# Patient Record
Sex: MALE | Race: WHITE | NOT HISPANIC OR LATINO | URBAN - METROPOLITAN AREA
[De-identification: names, ages, dates, MRNs, and addresses within clinical notes are randomized per-mention and may not be internally consistent; named-entity substitution may affect disease eponyms.]

---

## 2024-01-02 ENCOUNTER — HOSPITAL ENCOUNTER (OUTPATIENT)
Dept: RADIOLOGY | Facility: EXTERNAL LOCATION | Age: 59
Discharge: HOME | End: 2024-01-02

## 2024-01-02 DIAGNOSIS — S99.922A FOOT INJURY, LEFT, INITIAL ENCOUNTER: ICD-10-CM

## 2024-08-07 ENCOUNTER — APPOINTMENT (OUTPATIENT)
Dept: PRIMARY CARE | Facility: CLINIC | Age: 59
End: 2024-08-07
Payer: COMMERCIAL

## 2024-12-23 ENCOUNTER — APPOINTMENT (OUTPATIENT)
Dept: PRIMARY CARE | Facility: CLINIC | Age: 59
End: 2024-12-23
Payer: COMMERCIAL

## 2024-12-23 ENCOUNTER — LAB (OUTPATIENT)
Dept: LAB | Facility: LAB | Age: 59
End: 2024-12-23
Payer: COMMERCIAL

## 2024-12-23 VITALS
RESPIRATION RATE: 14 BRPM | HEART RATE: 83 BPM | TEMPERATURE: 97.6 F | WEIGHT: 280 LBS | BODY MASS INDEX: 41.47 KG/M2 | OXYGEN SATURATION: 99 % | HEIGHT: 69 IN | DIASTOLIC BLOOD PRESSURE: 72 MMHG | SYSTOLIC BLOOD PRESSURE: 124 MMHG

## 2024-12-23 DIAGNOSIS — Z12.5 SCREENING FOR PROSTATE CANCER: ICD-10-CM

## 2024-12-23 DIAGNOSIS — M79.601 RIGHT ARM PAIN: ICD-10-CM

## 2024-12-23 DIAGNOSIS — M65.341 TRIGGER RING FINGER OF RIGHT HAND: ICD-10-CM

## 2024-12-23 DIAGNOSIS — Z00.00 ROUTINE HEALTH MAINTENANCE: ICD-10-CM

## 2024-12-23 DIAGNOSIS — Z00.00 ROUTINE HEALTH MAINTENANCE: Primary | ICD-10-CM

## 2024-12-23 PROBLEM — R20.2 TINGLING IN EXTREMITIES: Status: ACTIVE | Noted: 2024-12-23

## 2024-12-23 PROBLEM — M54.31 SCIATICA OF RIGHT SIDE: Status: ACTIVE | Noted: 2024-12-23

## 2024-12-23 PROBLEM — M65.80: Status: ACTIVE | Noted: 2020-08-18

## 2024-12-23 PROBLEM — M79.672 PAIN IN LEFT FOOT: Status: ACTIVE | Noted: 2024-05-28

## 2024-12-23 PROBLEM — M72.2 PLANTAR FASCIITIS, LEFT: Status: ACTIVE | Noted: 2024-05-28

## 2024-12-23 PROBLEM — M77.31 CALCANEAL SPUR, RIGHT: Status: ACTIVE | Noted: 2020-08-18

## 2024-12-23 PROBLEM — M19.071 DEGENERATIVE JOINT DISEASE OF BOTH ANKLES AND FEET: Status: ACTIVE | Noted: 2024-05-28

## 2024-12-23 PROBLEM — M76.71 PERONEAL TENDONITIS OF RIGHT LOWER EXTREMITY: Status: ACTIVE | Noted: 2024-05-28

## 2024-12-23 PROBLEM — M93.271: Status: ACTIVE | Noted: 2024-05-28

## 2024-12-23 PROBLEM — M19.072 DEGENERATIVE JOINT DISEASE OF BOTH ANKLES AND FEET: Status: ACTIVE | Noted: 2024-05-28

## 2024-12-23 PROBLEM — M54.16 LUMBAR RADICULOPATHY: Status: ACTIVE | Noted: 2024-12-23

## 2024-12-23 PROBLEM — M72.2 PLANTAR FASCIITIS, RIGHT: Status: ACTIVE | Noted: 2020-08-18

## 2024-12-23 PROBLEM — M54.50 LOW BACK PAIN: Status: ACTIVE | Noted: 2024-12-23

## 2024-12-23 PROBLEM — H61.20 CERUMEN IMPACTION: Status: ACTIVE | Noted: 2024-12-23

## 2024-12-23 PROBLEM — M79.671 PAIN OF RIGHT HEEL: Status: ACTIVE | Noted: 2020-08-18

## 2024-12-23 LAB
ALBUMIN SERPL BCP-MCNC: 4.2 G/DL (ref 3.4–5)
ALP SERPL-CCNC: 80 U/L (ref 33–120)
ALT SERPL W P-5'-P-CCNC: 15 U/L (ref 10–52)
ANION GAP SERPL CALC-SCNC: 11 MMOL/L (ref 10–20)
AST SERPL W P-5'-P-CCNC: 14 U/L (ref 9–39)
BILIRUB SERPL-MCNC: 0.6 MG/DL (ref 0–1.2)
BUN SERPL-MCNC: 16 MG/DL (ref 6–23)
CALCIUM SERPL-MCNC: 9.5 MG/DL (ref 8.6–10.6)
CHLORIDE SERPL-SCNC: 106 MMOL/L (ref 98–107)
CHOLEST SERPL-MCNC: 174 MG/DL (ref 0–199)
CHOLESTEROL/HDL RATIO: 3.7
CO2 SERPL-SCNC: 30 MMOL/L (ref 21–32)
CREAT SERPL-MCNC: 1.01 MG/DL (ref 0.5–1.3)
EGFRCR SERPLBLD CKD-EPI 2021: 86 ML/MIN/1.73M*2
ERYTHROCYTE [DISTWIDTH] IN BLOOD BY AUTOMATED COUNT: 13.2 % (ref 11.5–14.5)
GLUCOSE SERPL-MCNC: 94 MG/DL (ref 74–99)
HCT VFR BLD AUTO: 46.8 % (ref 41–52)
HCV AB SER QL: NONREACTIVE
HDLC SERPL-MCNC: 47.4 MG/DL
HGB BLD-MCNC: 14.7 G/DL (ref 13.5–17.5)
HIV 1+2 AB+HIV1 P24 AG SERPL QL IA: NONREACTIVE
LDLC SERPL CALC-MCNC: 113 MG/DL
MCH RBC QN AUTO: 29.9 PG (ref 26–34)
MCHC RBC AUTO-ENTMCNC: 31.4 G/DL (ref 32–36)
MCV RBC AUTO: 95 FL (ref 80–100)
NON HDL CHOLESTEROL: 127 MG/DL (ref 0–149)
NRBC BLD-RTO: 0 /100 WBCS (ref 0–0)
PLATELET # BLD AUTO: 247 X10*3/UL (ref 150–450)
POTASSIUM SERPL-SCNC: 4.8 MMOL/L (ref 3.5–5.3)
PROT SERPL-MCNC: 6.7 G/DL (ref 6.4–8.2)
PSA SERPL-MCNC: 3.72 NG/ML
RBC # BLD AUTO: 4.91 X10*6/UL (ref 4.5–5.9)
SODIUM SERPL-SCNC: 142 MMOL/L (ref 136–145)
TRIGL SERPL-MCNC: 69 MG/DL (ref 0–149)
TSH SERPL-ACNC: 2.77 MIU/L (ref 0.44–3.98)
VLDL: 14 MG/DL (ref 0–40)
WBC # BLD AUTO: 3.8 X10*3/UL (ref 4.4–11.3)

## 2024-12-23 PROCEDURE — 84153 ASSAY OF PSA TOTAL: CPT

## 2024-12-23 PROCEDURE — 86803 HEPATITIS C AB TEST: CPT

## 2024-12-23 PROCEDURE — 1036F TOBACCO NON-USER: CPT | Performed by: STUDENT IN AN ORGANIZED HEALTH CARE EDUCATION/TRAINING PROGRAM

## 2024-12-23 PROCEDURE — 80061 LIPID PANEL: CPT

## 2024-12-23 PROCEDURE — 3008F BODY MASS INDEX DOCD: CPT | Performed by: STUDENT IN AN ORGANIZED HEALTH CARE EDUCATION/TRAINING PROGRAM

## 2024-12-23 PROCEDURE — 99204 OFFICE O/P NEW MOD 45 MIN: CPT | Performed by: STUDENT IN AN ORGANIZED HEALTH CARE EDUCATION/TRAINING PROGRAM

## 2024-12-23 PROCEDURE — 80053 COMPREHEN METABOLIC PANEL: CPT

## 2024-12-23 PROCEDURE — 85027 COMPLETE CBC AUTOMATED: CPT

## 2024-12-23 PROCEDURE — 84443 ASSAY THYROID STIM HORMONE: CPT

## 2024-12-23 PROCEDURE — 36415 COLL VENOUS BLD VENIPUNCTURE: CPT

## 2024-12-23 PROCEDURE — 87389 HIV-1 AG W/HIV-1&-2 AB AG IA: CPT

## 2024-12-23 ASSESSMENT — PATIENT HEALTH QUESTIONNAIRE - PHQ9
1. LITTLE INTEREST OR PLEASURE IN DOING THINGS: NOT AT ALL
2. FEELING DOWN, DEPRESSED OR HOPELESS: NOT AT ALL
SUM OF ALL RESPONSES TO PHQ9 QUESTIONS 1 AND 2: 0

## 2024-12-23 ASSESSMENT — ENCOUNTER SYMPTOMS
LIGHT-HEADEDNESS: 0
DYSURIA: 0
VOMITING: 0
CHILLS: 0
SHORTNESS OF BREATH: 0
NAUSEA: 0
FEVER: 0
DIARRHEA: 0
DIAPHORESIS: 0
DIZZINESS: 0

## 2024-12-23 NOTE — PROGRESS NOTES
"Subjective   Patient ID: Alf Horton is a 59 y.o. male who presents for Establish Care, Trigger Finger (Right ring finger. ), and Arm Pain (Upper right arm issues with ROM and pressure on that arm. ).  HPI    Right trigger finger started 3 weeks ago, hard to close at times. Pain flares to 8 out of 10, stress ball helps. Pain on flexor tendor. Right ring finger sometimes locks up and can pop. Never had this before. No numbness/tingling, weakness.      A few weeks ago he has had trouble lifting up his arm due to pain. Having arm in front of him on chest helps. Topical otc cream helps somewhat.     Works as shipping  coordinator for 21 years.     Review of Systems   Constitutional:  Negative for chills, diaphoresis and fever.   HENT:  Positive for hearing loss (chronic, has hearing aids.).    Eyes:  Negative for visual disturbance.   Respiratory:  Negative for shortness of breath.    Cardiovascular:  Negative for chest pain.   Gastrointestinal:  Negative for diarrhea, nausea and vomiting.   Genitourinary:  Negative for dysuria.   Neurological:  Negative for dizziness, syncope and light-headedness.       /72   Pulse 83   Temp 36.4 °C (97.6 °F)   Resp 14   Ht 1.753 m (5' 9\")   Wt 127 kg (280 lb)   SpO2 99%   BMI 41.35 kg/m²     Objective   Physical Exam  Vitals reviewed.   Constitutional:       General: He is not in acute distress.     Appearance: Normal appearance.   HENT:      Head: Normocephalic.   Cardiovascular:      Rate and Rhythm: Normal rate and regular rhythm.      Pulses: Normal pulses.   Pulmonary:      Effort: Pulmonary effort is normal. No respiratory distress.      Breath sounds: Normal breath sounds.   Abdominal:      General: There is no distension.   Musculoskeletal:         General: No deformity.      Comments: Positive empty can test on the right, strength intact bilateral upper extremities.  Sensation intact bilateral upper extremities.   Skin:     Capillary Refill: Capillary " refill takes less than 2 seconds.      Coloration: Skin is not jaundiced.   Neurological:      Mental Status: He is alert.         Assessment/Plan   Problem List Items Addressed This Visit    None  Visit Diagnoses       Routine health maintenance    -  Primary    Relevant Orders    Lipid Panel    Comprehensive Metabolic Panel    CBC    TSH with reflex to Free T4 if abnormal    HIV 1/2 Antigen/Antibody Screen with Reflex to Confirmation    Hepatitis C Antibody    Screening for prostate cancer        Relevant Orders    Prostate Spec.Ag,Screen    Trigger ring finger of right hand        Relevant Orders    Referral to Orthopaedic Surgery    Right arm pain        Relevant Orders    Referral to Sports Medicine            Right ring trigger finger  -Refer to ortho to assist with management  - Can try oral NSAIDs over-the-counter for a few days only as needed for pain    Right arm pain  -Unclear etiology, refer to sports med    Right foot pain  -Cortisone helps, sees podiatry    Health Maintenance  -Prostate Cancer Screening: Ordered  -Vaccinations: Will discuss  -Lung Cancer Screening: Not indicated  -AAA Screening: Not indicated  -Colonoscopy: Will discuss  -Screen for HIV/Hep C: Ordered  -Advance Care Planning: Will discuss    , 3 children. Son just got . Works as shipping  coordinator for 21 years.    Fasting labs ordered  CPE in 4 to 6 weeks, follow-up sooner if needed.

## 2025-01-15 ENCOUNTER — OFFICE VISIT (OUTPATIENT)
Dept: ORTHOPEDIC SURGERY | Facility: CLINIC | Age: 60
End: 2025-01-15
Payer: COMMERCIAL

## 2025-01-15 ENCOUNTER — HOSPITAL ENCOUNTER (OUTPATIENT)
Dept: RADIOLOGY | Facility: CLINIC | Age: 60
Discharge: HOME | End: 2025-01-15
Payer: COMMERCIAL

## 2025-01-15 DIAGNOSIS — M25.511 RIGHT SHOULDER PAIN, UNSPECIFIED CHRONICITY: ICD-10-CM

## 2025-01-15 DIAGNOSIS — M79.601 RIGHT ARM PAIN: ICD-10-CM

## 2025-01-15 DIAGNOSIS — M75.41 ROTATOR CUFF IMPINGEMENT SYNDROME OF RIGHT SHOULDER: Primary | ICD-10-CM

## 2025-01-15 DIAGNOSIS — M65.341 TRIGGER RING FINGER OF RIGHT HAND: ICD-10-CM

## 2025-01-15 PROCEDURE — 99203 OFFICE O/P NEW LOW 30 MIN: CPT | Performed by: ORTHOPAEDIC SURGERY

## 2025-01-15 PROCEDURE — 99203 OFFICE O/P NEW LOW 30 MIN: CPT | Performed by: FAMILY MEDICINE

## 2025-01-15 PROCEDURE — 2500000004 HC RX 250 GENERAL PHARMACY W/ HCPCS (ALT 636 FOR OP/ED): Performed by: FAMILY MEDICINE

## 2025-01-15 PROCEDURE — 20550 NJX 1 TENDON SHEATH/LIGAMENT: CPT | Mod: RT | Performed by: ORTHOPAEDIC SURGERY

## 2025-01-15 PROCEDURE — 99213 OFFICE O/P EST LOW 20 MIN: CPT | Mod: 25 | Performed by: FAMILY MEDICINE

## 2025-01-15 PROCEDURE — 20610 DRAIN/INJ JOINT/BURSA W/O US: CPT | Mod: RT | Performed by: FAMILY MEDICINE

## 2025-01-15 PROCEDURE — 99213 OFFICE O/P EST LOW 20 MIN: CPT | Mod: 25 | Performed by: ORTHOPAEDIC SURGERY

## 2025-01-15 PROCEDURE — 1036F TOBACCO NON-USER: CPT | Performed by: FAMILY MEDICINE

## 2025-01-15 PROCEDURE — 2500000004 HC RX 250 GENERAL PHARMACY W/ HCPCS (ALT 636 FOR OP/ED): Performed by: ORTHOPAEDIC SURGERY

## 2025-01-15 PROCEDURE — 1036F TOBACCO NON-USER: CPT | Performed by: ORTHOPAEDIC SURGERY

## 2025-01-15 PROCEDURE — 73030 X-RAY EXAM OF SHOULDER: CPT | Mod: RT

## 2025-01-15 PROCEDURE — 73030 X-RAY EXAM OF SHOULDER: CPT | Mod: RIGHT SIDE | Performed by: RADIOLOGY

## 2025-01-15 RX ORDER — LIDOCAINE HYDROCHLORIDE 20 MG/ML
2 INJECTION, SOLUTION INFILTRATION; PERINEURAL
Status: COMPLETED | OUTPATIENT
Start: 2025-01-15 | End: 2025-01-15

## 2025-01-15 RX ORDER — TRIAMCINOLONE ACETONIDE 40 MG/ML
40 INJECTION, SUSPENSION INTRA-ARTICULAR; INTRAMUSCULAR
Status: COMPLETED | OUTPATIENT
Start: 2025-01-15 | End: 2025-01-15

## 2025-01-15 RX ADMIN — TRIAMCINOLONE ACETONIDE 40 MG: 400 INJECTION, SUSPENSION INTRA-ARTICULAR; INTRAMUSCULAR at 16:17

## 2025-01-15 RX ADMIN — LIDOCAINE HYDROCHLORIDE 0.5 ML: 10 INJECTION, SOLUTION INFILTRATION; PERINEURAL at 08:40

## 2025-01-15 RX ADMIN — TRIAMCINOLONE ACETONIDE 20 MG: 40 INJECTION, SUSPENSION INTRA-ARTICULAR; INTRAMUSCULAR at 08:40

## 2025-01-15 RX ADMIN — LIDOCAINE HYDROCHLORIDE 2 ML: 20 INJECTION, SOLUTION INFILTRATION; PERINEURAL at 16:17

## 2025-01-15 NOTE — LETTER
January 15, 2025     Andrez Akers DO  [address]  [facility], [address]  [facility] [address]    Patient: Afl Horton   YOB: 1965   Date of Visit: 1/15/2025       Dear Dr. Andrez Akers, :    Thank you for referring Alf Horton to me for evaluation. Below are my notes for this consultation.  If you have questions, please do not hesitate to call me. I look forward to following your patient along with you.       Sincerely,     Chirag Villatoro MD      CC: No Recipients  ______________________________________________________________________________________      History of Present Illness   Chief Complaint   Patient presents with   • Right Shoulder - Pain     NPV R SHOULDER PAIN X COUPLE MONTHS NKI       The patient is 59 y.o. right-hand-dominant male  here with a complaint of right shoulder/upper arm pain.  Referred by PCP Andrez Akers.  onset of symptoms over the past 3 months, atraumatic in nature, does do some repetitive arm/shoulder activity at work which he thinks may have aggravated symptoms though he cannot pinpoint any specific injury.  He says that he has been having notable discomfort laying on his shoulder at night, he has pain with shoulder abduction.  He feels like there is some limited range of motion with this.  He has been taking ibuprofen and using some topical medications with minimal change.  He denies any popping or clicking, no shoulder instability.  He denies any significant shoulder problems in the past.    History reviewed. No pertinent past medical history.    Medication Documentation Review Audit       Reviewed by Andrew Hallman MA (Medical Assistant) on 01/15/25 at 1558      Medication Order Taking? Sig Documenting Provider Last Dose Status            No Medications to Display                                   Allergies   Allergen Reactions   • Penicillins Hives and Rash     Hives       Social History     Socioeconomic History   • Marital status:       Spouse name: Not on file   • Number of children: Not on file   • Years of education: Not on file   • Highest education level: Not on file   Occupational History   • Not on file   Tobacco Use   • Smoking status: Never   • Smokeless tobacco: Never   Substance and Sexual Activity   • Alcohol use: Never   • Drug use: Never   • Sexual activity: Not on file   Other Topics Concern   • Not on file   Social History Narrative   • Not on file     Social Drivers of Health     Financial Resource Strain: Not on file   Food Insecurity: Not on file   Transportation Needs: Not on file   Physical Activity: Not on file   Stress: Not on file   Social Connections: Not on file   Intimate Partner Violence: Not on file   Housing Stability: Not on file       Past Surgical History:   Procedure Laterality Date   • OTHER SURGICAL HISTORY  03/04/2020    Appendectomy          Review of Systems   GENERAL: Negative  GI: Negative  MUSCULOSKELETAL: See HPI  SKIN: Negative  NEURO:  Negative     Physical Exam:    General/Constitutional: well appearing, no distress, appears stated age  HEENT: sclera clear  Respiratory: non labored breathing  Vascular: No edema, swelling or tenderness, except as noted in detailed exam.  Integumentary: No impressive skin lesions present, except as noted in detailed exam.  Neurological:  Alert and oriented   Psychological:  Normal mood and affect.  Musculoskeletal: Normal, except as noted in detailed exam and in HPI    Right shoulder: Normal appearance, glenohumeral joint is reduced, no skin changes.  There is some mild tenderness at the subacromial space, there is no tenderness at the biceps muscle belly, normal contour of the biceps muscle.  He has good range of motion with forward flexion without pain, slight limited with abduction to around 120 degrees actively with positive painful arc, no passive range of motion with abduction but there is pain.  There is no weakness with rotator cuff strength testing,  there are some mild discomfort with resisted abduction.  There is pain with Edwards and Neer's test.  Mild pain with Jefferson test.  Negative Speed, negative Yergason.  No shoulder instability.       Imaging: X-rays of right shoulder obtained today and independently reviewed, there is some degenerative changes of the AC joint, normal-appearing glenohumeral joint, no acute abnormalities are present.    L Inj/Asp: R subacromial bursa on 1/15/2025 4:17 PM  Indications: pain  Details: 22 G needle, posterior approach  Medications: 40 mg triamcinolone acetonide 40 mg/mL; 2 mL lidocaine 20 mg/mL (2 %)  Outcome: tolerated well, no immediate complications  Procedure, treatment alternatives, risks and benefits explained, specific risks discussed. Consent was given by the patient. Immediately prior to procedure a time out was called to verify the correct patient, procedure, equipment, support staff and site/side marked as required. Patient was prepped and draped in the usual sterile fashion.               Assessment   1. Rotator cuff impingement syndrome of right shoulder        2. Right shoulder pain, unspecified chronicity  XR shoulder right 2+ views      3. Right arm pain  Referral to Sports Medicine            Plan: Right shoulder pain consistent with rotator cuff impingement syndrome.  Discussed diagnosis, reviewed x-rays, treatment with patient.,  Explained that upper arm pain is typically referred pain from the shoulder/rotator cuff.  We did proceed with subacromial injection with Kenalog today, he is also given a home exercise rehabilitation/shoulder strengthening program that he will begin.  He will monitor for improvement with conservative treatment.  He will plan to follow-up as symptoms dictate.

## 2025-01-15 NOTE — PROGRESS NOTES
History of Present Illness   Chief Complaint   Patient presents with    Right Shoulder - Pain     NPV R SHOULDER PAIN X COUPLE MONTHS NKI       The patient is 59 y.o. right-hand-dominant male  here with a complaint of right shoulder/upper arm pain.  Referred by PCP Andrez Akers.  onset of symptoms over the past 3 months, atraumatic in nature, does do some repetitive arm/shoulder activity at work which he thinks may have aggravated symptoms though he cannot pinpoint any specific injury.  He says that he has been having notable discomfort laying on his shoulder at night, he has pain with shoulder abduction.  He feels like there is some limited range of motion with this.  He has been taking ibuprofen and using some topical medications with minimal change.  He denies any popping or clicking, no shoulder instability.  He denies any significant shoulder problems in the past.    History reviewed. No pertinent past medical history.    Medication Documentation Review Audit       Reviewed by Andrew Hallman MA (Medical Assistant) on 01/15/25 at 1558      Medication Order Taking? Sig Documenting Provider Last Dose Status            No Medications to Display                                   Allergies   Allergen Reactions    Penicillins Hives and Rash     Hives       Social History     Socioeconomic History    Marital status:      Spouse name: Not on file    Number of children: Not on file    Years of education: Not on file    Highest education level: Not on file   Occupational History    Not on file   Tobacco Use    Smoking status: Never    Smokeless tobacco: Never   Substance and Sexual Activity    Alcohol use: Never    Drug use: Never    Sexual activity: Not on file   Other Topics Concern    Not on file   Social History Narrative    Not on file     Social Drivers of Health     Financial Resource Strain: Not on file   Food Insecurity: Not on file   Transportation Needs: Not on file   Physical Activity: Not  on file   Stress: Not on file   Social Connections: Not on file   Intimate Partner Violence: Not on file   Housing Stability: Not on file       Past Surgical History:   Procedure Laterality Date    OTHER SURGICAL HISTORY  03/04/2020    Appendectomy          Review of Systems   GENERAL: Negative  GI: Negative  MUSCULOSKELETAL: See HPI  SKIN: Negative  NEURO:  Negative     Physical Exam:    General/Constitutional: well appearing, no distress, appears stated age  HEENT: sclera clear  Respiratory: non labored breathing  Vascular: No edema, swelling or tenderness, except as noted in detailed exam.  Integumentary: No impressive skin lesions present, except as noted in detailed exam.  Neurological:  Alert and oriented   Psychological:  Normal mood and affect.  Musculoskeletal: Normal, except as noted in detailed exam and in HPI    Right shoulder: Normal appearance, glenohumeral joint is reduced, no skin changes.  There is some mild tenderness at the subacromial space, there is no tenderness at the biceps muscle belly, normal contour of the biceps muscle.  He has good range of motion with forward flexion without pain, slight limited with abduction to around 120 degrees actively with positive painful arc, no passive range of motion with abduction but there is pain.  There is no weakness with rotator cuff strength testing, there are some mild discomfort with resisted abduction.  There is pain with Edwards and Neer's test.  Mild pain with Lowell test.  Negative Speed, negative Yergason.  No shoulder instability.       Imaging: X-rays of right shoulder obtained today and independently reviewed, there is some degenerative changes of the AC joint, normal-appearing glenohumeral joint, no acute abnormalities are present.    L Inj/Asp: R subacromial bursa on 1/15/2025 4:17 PM  Indications: pain  Details: 22 G needle, posterior approach  Medications: 40 mg triamcinolone acetonide 40 mg/mL; 2 mL lidocaine 20 mg/mL (2 %)  Outcome:  tolerated well, no immediate complications  Procedure, treatment alternatives, risks and benefits explained, specific risks discussed. Consent was given by the patient. Immediately prior to procedure a time out was called to verify the correct patient, procedure, equipment, support staff and site/side marked as required. Patient was prepped and draped in the usual sterile fashion.               Assessment   1. Rotator cuff impingement syndrome of right shoulder        2. Right shoulder pain, unspecified chronicity  XR shoulder right 2+ views      3. Right arm pain  Referral to Sports Medicine            Plan: Right shoulder pain consistent with rotator cuff impingement syndrome.  Discussed diagnosis, reviewed x-rays, treatment with patient.,  Explained that upper arm pain is typically referred pain from the shoulder/rotator cuff.  We did proceed with subacromial injection with Kenalog today, he is also given a home exercise rehabilitation/shoulder strengthening program that he will begin.  He will monitor for improvement with conservative treatment.  He will plan to follow-up as symptoms dictate.

## 2025-01-15 NOTE — Clinical Note
January 16, 2025     Andrez Akers,   [address]  [facility], [address]  [facility] [address]    Patient: Alf Horton   YOB: 1965   Date of Visit: 1/15/2025       Dear Dr. Andrez Akers, DO:    Thank you for referring Alf Horton to me for evaluation. Below are my notes for this consultation.  If you have questions, please do not hesitate to call me. I look forward to following your patient along with you.       Sincerely,     Isaias Jean Baptiste MD      CC: No Recipients  ______________________________________________________________________________________

## 2025-01-15 NOTE — LETTER
tingling.  No similar problems in other digits.  No particular intervention so far.    He is not diabetic or hypothyroid.  He does not smoke.      REVIEW OF SYSTEMS       A 30-item multi-system Review Of Systems was obtained on today's intake form.  This was reviewed with the patient and is correct.  The pertinent positives and negatives are listed above.  The form has been scanned separately into the medical record.      PHYSICAL EXAM    Constitutional:    Appears stated age. Well-developed and well-nourished morbidly obese male in no acute distress.  Psychiatric:         Pleasant normal mood and affect. Behavior is appropriate for the situation.   Head:                   Normocephalic and atraumatic.  Eyes:                    Pupils are equal and round.  Cardiovascular:  2+ radial and ulnar pulses. Fingers well-perfused.  Respiratory:        Effort normal. No respiratory distress. Speaking in complete sentences.  Neurologic:       Alert and oriented to person, place, and time.  Skin:                Skin is intact, warm and dry.  Hematologic / Lymphatic:    No lymphedema or lymphangitis.    Extremities / Musculoskeletal:                      Skin of right ring finger and hand is intact with no erythema, ecchymosis, or diffuse swelling.  Normal skin drag and coloration.  Full composite finger flexion extension with good intrinsic plus minus posture.  Symmetric wrist and forearm motion.  Palpable flexor nodule and A1 tenderness only of right ring finger.  Obvious spontaneous triggering.  Negative Rosa.  Negative midcarpal shift.  Sensation intact to light touch in all distributions.  Capillary refill less than 2 seconds.      IMAGING / LABS / EMGs           None pertinent      No past medical history on file.    Medication Documentation Review Audit       Reviewed by Andrez Akers DO (Physician) on 12/23/24 at 0859      Medication Order Taking? Sig Documenting Provider Last Dose Status            No Medications to  Display                                   Allergies   Allergen Reactions   • Penicillins Hives and Rash     Hives       Social History     Socioeconomic History   • Marital status:      Spouse name: Not on file   • Number of children: Not on file   • Years of education: Not on file   • Highest education level: Not on file   Occupational History   • Not on file   Tobacco Use   • Smoking status: Never   • Smokeless tobacco: Never   Substance and Sexual Activity   • Alcohol use: Never   • Drug use: Never   • Sexual activity: Not on file   Other Topics Concern   • Not on file   Social History Narrative   • Not on file     Social Drivers of Health     Financial Resource Strain: Not on file   Food Insecurity: Not on file   Transportation Needs: Not on file   Physical Activity: Not on file   Stress: Not on file   Social Connections: Not on file   Intimate Partner Violence: Not on file   Housing Stability: Not on file       Past Surgical History:   Procedure Laterality Date   • OTHER SURGICAL HISTORY  03/04/2020    Appendectomy       PROCEDURE    Hand / UE Inj/Asp: R ring A1 for trigger finger on 1/15/2025 8:40 AM  Indications: therapeutic  Details: 25 G needle, volar approach  Medications: 20 mg triamcinolone acetonide 40 mg/mL; 0.5 mL lidocaine 10 mg/mL (1 %)  Outcome: tolerated well, no immediate complications  Procedure, treatment alternatives, risks and benefits explained, specific risks discussed. Consent was given by the patient.           Electronically Signed      ALOK Jean Baptiste MD      Orthopaedic Hand Surgery      [phone]

## 2025-01-15 NOTE — PROGRESS NOTES
CHIEF COMPLAINT         Right ring trigger finger    ASSESSMENT + PLAN     ***        HISTORY OF PRESENT ILLNESS       Patient is a 59 y.o. ***-hand dominant male ***, who presents today for evaluation of ***      REVIEW OF SYSTEMS       A 30-item multi-system Review Of Systems was obtained on today's intake form.  This was reviewed with the patient and is correct.  The pertinent positives and negatives are listed above.  The form has been scanned separately into the medical record.      PHYSICAL EXAM    Constitutional:    Appears stated age. Well-developed and well-nourished ***.  Psychiatric:         Pleasant normal mood and affect. Behavior is appropriate for the situation.   Head:                   Normocephalic and atraumatic.  Eyes:                    Pupils are equal and round.  Cardiovascular:  2+ radial and ulnar pulses. Fingers well-perfused.  Respiratory:        Effort normal. No respiratory distress. Speaking in complete sentences.  Neurologic:       Alert and oriented to person, place, and time.  Skin:                Skin is intact, warm and dry.  Hematologic / Lymphatic:    No lymphedema or lymphangitis.    Extremities / Musculoskeletal:                      ***      IMAGING / LABS / EMGs           ***      No past medical history on file.    Medication Documentation Review Audit       Reviewed by Andrez Akers DO (Physician) on 12/23/24 at 0859      Medication Order Taking? Sig Documenting Provider Last Dose Status            No Medications to Display                                   Allergies   Allergen Reactions    Penicillins Hives and Rash     Hives       Social History     Socioeconomic History    Marital status:      Spouse name: Not on file    Number of children: Not on file    Years of education: Not on file    Highest education level: Not on file   Occupational History    Not on file   Tobacco Use    Smoking status: Never    Smokeless tobacco: Never   Substance and Sexual Activity     Alcohol use: Never    Drug use: Never    Sexual activity: Not on file   Other Topics Concern    Not on file   Social History Narrative    Not on file     Social Drivers of Health     Financial Resource Strain: Not on file   Food Insecurity: Not on file   Transportation Needs: Not on file   Physical Activity: Not on file   Stress: Not on file   Social Connections: Not on file   Intimate Partner Violence: Not on file   Housing Stability: Not on file       Past Surgical History:   Procedure Laterality Date    OTHER SURGICAL HISTORY  03/04/2020    Appendectomy         Electronically Signed      ALOK Jean Baptiste MD      Orthopaedic Hand Surgery      [phone]   Not on file     Social Drivers of Health     Financial Resource Strain: Not on file   Food Insecurity: Not on file   Transportation Needs: Not on file   Physical Activity: Not on file   Stress: Not on file   Social Connections: Not on file   Intimate Partner Violence: Not on file   Housing Stability: Not on file       Past Surgical History:   Procedure Laterality Date    OTHER SURGICAL HISTORY  03/04/2020    Appendectomy       PROCEDURE    Hand / UE Inj/Asp: R ring A1 for trigger finger on 1/15/2025 8:40 AM  Indications: therapeutic  Details: 25 G needle, volar approach  Medications: 20 mg triamcinolone acetonide 40 mg/mL; 0.5 mL lidocaine 10 mg/mL (1 %)  Outcome: tolerated well, no immediate complications  Procedure, treatment alternatives, risks and benefits explained, specific risks discussed. Consent was given by the patient.           Electronically Signed      ALOK Jean Baptiste MD      Orthopaedic Hand Surgery      [phone]

## 2025-01-22 PROBLEM — M65.341 TRIGGER RING FINGER OF RIGHT HAND: Status: ACTIVE | Noted: 2025-01-22

## 2025-01-22 RX ORDER — LIDOCAINE HYDROCHLORIDE 10 MG/ML
0.5 INJECTION, SOLUTION INFILTRATION; PERINEURAL
Status: COMPLETED | OUTPATIENT
Start: 2025-01-15 | End: 2025-01-15

## 2025-01-22 RX ORDER — TRIAMCINOLONE ACETONIDE 40 MG/ML
20 INJECTION, SUSPENSION INTRA-ARTICULAR; INTRAMUSCULAR
Status: COMPLETED | OUTPATIENT
Start: 2025-01-15 | End: 2025-01-15

## 2025-02-04 ENCOUNTER — APPOINTMENT (OUTPATIENT)
Dept: PRIMARY CARE | Facility: CLINIC | Age: 60
End: 2025-02-04
Payer: COMMERCIAL

## 2025-02-04 VITALS
HEART RATE: 72 BPM | TEMPERATURE: 98.3 F | WEIGHT: 279 LBS | SYSTOLIC BLOOD PRESSURE: 112 MMHG | OXYGEN SATURATION: 99 % | DIASTOLIC BLOOD PRESSURE: 68 MMHG | BODY MASS INDEX: 41.32 KG/M2 | HEIGHT: 69 IN | RESPIRATION RATE: 16 BRPM

## 2025-02-04 DIAGNOSIS — M25.511 RIGHT SHOULDER PAIN, UNSPECIFIED CHRONICITY: ICD-10-CM

## 2025-02-04 DIAGNOSIS — D72.819 LEUKOPENIA, UNSPECIFIED TYPE: ICD-10-CM

## 2025-02-04 DIAGNOSIS — M79.671 PAIN OF RIGHT HEEL: ICD-10-CM

## 2025-02-04 DIAGNOSIS — R06.83 SNORING: ICD-10-CM

## 2025-02-04 DIAGNOSIS — E78.5 HYPERLIPIDEMIA, UNSPECIFIED HYPERLIPIDEMIA TYPE: Primary | ICD-10-CM

## 2025-02-04 DIAGNOSIS — Z00.00 ROUTINE HEALTH MAINTENANCE: ICD-10-CM

## 2025-02-04 DIAGNOSIS — M65.341 TRIGGER RING FINGER OF RIGHT HAND: ICD-10-CM

## 2025-02-04 PROCEDURE — 93000 ELECTROCARDIOGRAM COMPLETE: CPT | Performed by: STUDENT IN AN ORGANIZED HEALTH CARE EDUCATION/TRAINING PROGRAM

## 2025-02-04 ASSESSMENT — ENCOUNTER SYMPTOMS
DIARRHEA: 0
NAUSEA: 0
FEVER: 0
DIAPHORESIS: 0
MYALGIAS: 0
DYSURIA: 0
VOMITING: 0
SHORTNESS OF BREATH: 0
CHILLS: 0
LIGHT-HEADEDNESS: 0
DIZZINESS: 0

## 2025-02-04 ASSESSMENT — PATIENT HEALTH QUESTIONNAIRE - PHQ9
2. FEELING DOWN, DEPRESSED OR HOPELESS: NOT AT ALL
SUM OF ALL RESPONSES TO PHQ9 QUESTIONS 1 AND 2: 0
1. LITTLE INTEREST OR PLEASURE IN DOING THINGS: NOT AT ALL

## 2025-02-04 NOTE — PROGRESS NOTES
"Subjective   Patient ID: Alf Horton is a 59 y.o. male who presents for Annual Exam.  HPI  Here for CPE. No acute concerns today. We reviewed labs and conditions in detail.      Review of Systems   Constitutional:  Negative for chills, diaphoresis and fever.   HENT:  Negative for hearing loss (has hearing aids).    Eyes:  Negative for visual disturbance.   Respiratory:  Negative for shortness of breath.    Cardiovascular:  Negative for chest pain.   Gastrointestinal:  Negative for diarrhea, nausea and vomiting.   Endocrine: Negative for cold intolerance and heat intolerance.   Genitourinary:  Negative for dysuria, scrotal swelling and testicular pain.   Musculoskeletal:  Negative for myalgias.   Skin:  Negative for rash.   Neurological:  Negative for dizziness, syncope and light-headedness.       /68   Pulse 72   Temp 36.8 °C (98.3 °F)   Resp 16   Ht 1.753 m (5' 9\")   Wt 127 kg (279 lb)   SpO2 99%   BMI 41.20 kg/m²     Objective   Physical Exam  Vitals reviewed.   Constitutional:       General: He is not in acute distress.     Appearance: Normal appearance.   HENT:      Head: Normocephalic.      Right Ear: Tympanic membrane, ear canal and external ear normal. There is no impacted cerumen.      Left Ear: Tympanic membrane, ear canal and external ear normal. There is no impacted cerumen.      Mouth/Throat:      Mouth: Mucous membranes are moist.      Pharynx: Oropharynx is clear. No oropharyngeal exudate or posterior oropharyngeal erythema.   Cardiovascular:      Rate and Rhythm: Normal rate and regular rhythm.   Pulmonary:      Effort: Pulmonary effort is normal. No respiratory distress.      Breath sounds: Normal breath sounds.   Abdominal:      General: Bowel sounds are normal. There is no distension.      Palpations: Abdomen is soft. There is no mass.      Tenderness: There is no abdominal tenderness. There is no guarding or rebound.   Musculoskeletal:         General: No deformity.      Cervical " back: Neck supple. No tenderness.   Lymphadenopathy:      Cervical: No cervical adenopathy.   Skin:     Coloration: Skin is not jaundiced.   Neurological:      Mental Status: He is alert.         Assessment/Plan   Problem List Items Addressed This Visit       Pain of right heel    Trigger ring finger of right hand    Leukopenia    Relevant Orders    CBC and Auto Differential    BMI 40.0-44.9, adult (Multi)    Relevant Orders    Home sleep apnea test (HSAT)    Snoring    Relevant Orders    Home sleep apnea test (HSAT)    Hyperlipidemia - Primary    Relevant Orders    ECG 12 lead (Clinic Performed) (Completed)    Routine health maintenance    Relevant Orders    Lipid Panel    Right shoulder pain         Right ring trigger finger  -Saw orthopedics, will see again in 4 weeks.  He underwent injection.     Right rotator cuff  - Saw orthopedics had injection and given regimen for home exercise and shoulder strengthening.  He will see as needed.     Right foot pain  -Cortisone helps, sees podiatry    BMI 41  -He had wt loss with diet and portion control  -He will work on diet and exercise more  -Wife says he snores intermittently. HSAT ordered    Hyperlipidemia  - EKG in office unremarkable  -Reviewed lipid panel and ascvd risks and benefit of statin vs risks. Defer meds now  -Repeat lipids in 3 months  -Work on diet and exercise    Leukopenia  -Repeat cbc in 4-6 weeks    Health Maintenance  -Prostate Cancer Screening: Normal 12/24  -Vaccinations: Recommend flu vaccine, shingrix, TDAP. Reports had TDAP in last 10 years.   -Lung Cancer Screening: Not indicated  -AAA Screening: Not indicated  -Colonoscopy: Discussed colonoscopy vs cologuard, explained risk of missing colon cancer. He is unsure what to get and hesitates to get colonoscopy. He will let us know what he decides.   -Screen for HIV/Hep C: Normal 12/24  -Advance Care Planning: He is ok with wife Lolly to make decisions if he is unable to.      , 3 children.  Son just got . Works as shipping  coordinator for 21 years.     Fasting labs ordered  CPE in 1 year  F/u 3 months lipids sooner PRN

## 2025-02-08 PROBLEM — Z00.00 ROUTINE HEALTH MAINTENANCE: Status: ACTIVE | Noted: 2025-02-08

## 2025-02-08 PROBLEM — E78.5 HYPERLIPIDEMIA: Status: ACTIVE | Noted: 2025-02-08

## 2025-02-08 PROBLEM — R06.83 SNORING: Status: ACTIVE | Noted: 2025-02-08

## 2025-02-08 PROBLEM — D72.819 LEUKOPENIA: Status: ACTIVE | Noted: 2025-02-08

## 2025-02-08 PROBLEM — M25.511 RIGHT SHOULDER PAIN: Status: ACTIVE | Noted: 2025-02-08

## 2025-02-11 ENCOUNTER — PROCEDURE VISIT (OUTPATIENT)
Dept: SLEEP MEDICINE | Facility: HOSPITAL | Age: 60
End: 2025-02-11
Payer: COMMERCIAL

## 2025-02-11 DIAGNOSIS — R06.83 SNORING: ICD-10-CM

## 2025-02-11 PROCEDURE — 95806 SLEEP STUDY UNATT&RESP EFFT: CPT | Performed by: PSYCHIATRY & NEUROLOGY

## 2025-02-20 DIAGNOSIS — G47.33 OBSTRUCTIVE SLEEP APNEA: Primary | ICD-10-CM

## 2025-03-02 LAB
BASOPHILS # BLD AUTO: 30 CELLS/UL (ref 0–200)
BASOPHILS NFR BLD AUTO: 0.6 %
EOSINOPHIL # BLD AUTO: 20 CELLS/UL (ref 15–500)
EOSINOPHIL NFR BLD AUTO: 0.4 %
ERYTHROCYTE [DISTWIDTH] IN BLOOD BY AUTOMATED COUNT: 12.8 % (ref 11–15)
HCT VFR BLD AUTO: 45.3 % (ref 38.5–50)
HGB BLD-MCNC: 14.7 G/DL (ref 13.2–17.1)
LYMPHOCYTES # BLD AUTO: 1620 CELLS/UL (ref 850–3900)
LYMPHOCYTES NFR BLD AUTO: 32.4 %
MCH RBC QN AUTO: 31.1 PG (ref 27–33)
MCHC RBC AUTO-ENTMCNC: 32.5 G/DL (ref 32–36)
MCV RBC AUTO: 96 FL (ref 80–100)
MONOCYTES # BLD AUTO: 585 CELLS/UL (ref 200–950)
MONOCYTES NFR BLD AUTO: 11.7 %
NEUTROPHILS # BLD AUTO: 2745 CELLS/UL (ref 1500–7800)
NEUTROPHILS NFR BLD AUTO: 54.9 %
PLATELET # BLD AUTO: 251 THOUSAND/UL (ref 140–400)
PMV BLD REES-ECKER: 9.6 FL (ref 7.5–12.5)
RBC # BLD AUTO: 4.72 MILLION/UL (ref 4.2–5.8)
WBC # BLD AUTO: 5 THOUSAND/UL (ref 3.8–10.8)

## 2025-03-04 DIAGNOSIS — D72.819 LEUKOPENIA, UNSPECIFIED TYPE: ICD-10-CM

## 2025-04-04 ENCOUNTER — OFFICE VISIT (OUTPATIENT)
Dept: URGENT CARE | Age: 60
End: 2025-04-04
Payer: COMMERCIAL

## 2025-04-04 VITALS
SYSTOLIC BLOOD PRESSURE: 131 MMHG | TEMPERATURE: 97.8 F | DIASTOLIC BLOOD PRESSURE: 87 MMHG | OXYGEN SATURATION: 98 % | HEART RATE: 80 BPM | RESPIRATION RATE: 24 BRPM

## 2025-04-04 DIAGNOSIS — R53.83 OTHER FATIGUE: Primary | ICD-10-CM

## 2025-04-04 DIAGNOSIS — R63.1 POLYDIPSIA: ICD-10-CM

## 2025-04-04 DIAGNOSIS — R11.0 NAUSEA: ICD-10-CM

## 2025-04-04 DIAGNOSIS — R35.89 POLYURIA: ICD-10-CM

## 2025-04-04 DIAGNOSIS — R51.9 NONINTRACTABLE EPISODIC HEADACHE, UNSPECIFIED HEADACHE TYPE: ICD-10-CM

## 2025-04-04 PROCEDURE — 1036F TOBACCO NON-USER: CPT

## 2025-04-04 PROCEDURE — 99214 OFFICE O/P EST MOD 30 MIN: CPT

## 2025-04-04 RX ORDER — ONDANSETRON 4 MG/1
4 TABLET, ORALLY DISINTEGRATING ORAL EVERY 8 HOURS PRN
Qty: 10 TABLET | Refills: 0 | Status: SHIPPED | OUTPATIENT
Start: 2025-04-04

## 2025-04-04 RX ORDER — DICLOFENAC SODIUM 10 MG/G
GEL TOPICAL
COMMUNITY
Start: 2025-01-14

## 2025-04-04 ASSESSMENT — ENCOUNTER SYMPTOMS
CHILLS: 0
NAUSEA: 1
WHEEZING: 0
DIARRHEA: 0
FEVER: 0
CARDIOVASCULAR NEGATIVE: 1
VOMITING: 0
ABDOMINAL PAIN: 0
APPETITE CHANGE: 0
RESPIRATORY NEGATIVE: 1
PALPITATIONS: 0
DYSURIA: 0
HEADACHES: 1
COUGH: 0
ACTIVITY CHANGE: 0
MUSCULOSKELETAL NEGATIVE: 1
HEMATURIA: 0
SHORTNESS OF BREATH: 0
FATIGUE: 1

## 2025-04-04 ASSESSMENT — PAIN SCALES - GENERAL: PAINLEVEL_OUTOF10: 4

## 2025-04-04 NOTE — PROGRESS NOTES
Subjective   Patient ID: Alf Horton is a 59 y.o. male. They present today with a chief complaint of Other (  Per Pt hx feeling dizziness and nausea early in morning and in middle of night on and off for a couple months and stated has been having headaches.).    History of Present Illness  Subjective  Alf Horton is a 59 y.o. male who presents for evaluation of fatigue. Symptoms began several months ago. The patient feels the fatigue began with an episode of nausea and vomiting. Symptoms of his fatigue have been fatigue with paradoxical insomnia, general malaise, and headaches. He endorses being nauseated and dizzy in the morning upon arising. He has been having occasional headaches that last for several hours and are refractory to OTC medication. Patient describes the following psychological symptoms: none. Patient denies cold intolerance, constipation, fever, GI blood loss, significant change in weight, and unusual rashes. Symptoms have waxed and waned. Symptom severity: symptoms bothersome, but easily able to carry out all usual work/school/family activities. Previous visits for this problem: none. Pt states he drinks a large amount of water daily and has been urinating more than usual. Denies urinary symptoms. Denies fever, chills; no other constitutional complaints.      See orders for lab evaluation.  Follow up in 2 weeks with PCP.        History provided by:  Patient, medical records and spouse      Past Medical History  Allergies as of 04/04/2025 - Reviewed 04/04/2025   Allergen Reaction Noted    Penicillins Hives and Rash 10/12/2012       (Not in a hospital admission)       History reviewed. No pertinent past medical history.    Past Surgical History:   Procedure Laterality Date    OTHER SURGICAL HISTORY  03/04/2020    Appendectomy        reports that he has quit smoking. His smoking use included cigarettes. He has been exposed to tobacco smoke. He has never used smokeless tobacco. Drug use  questions deferred to the physician. He reports that he does not drink alcohol.    Review of Systems  Review of Systems   Constitutional:  Positive for fatigue. Negative for activity change, appetite change, chills and fever.   HENT: Negative.     Respiratory: Negative.  Negative for cough, shortness of breath and wheezing.    Cardiovascular: Negative.  Negative for chest pain, palpitations and leg swelling.   Gastrointestinal:  Positive for nausea. Negative for abdominal pain, diarrhea and vomiting.   Genitourinary:  Negative for dysuria, hematuria and urgency.   Musculoskeletal: Negative.    Skin: Negative.    Neurological:  Positive for headaches.   All other systems reviewed and are negative.                                 Objective    Vitals:    04/04/25 1447   BP: 131/87   BP Location: Left arm   Patient Position: Sitting   BP Cuff Size: Large adult   Pulse: 80   Resp: 24   Temp: 36.6 °C (97.8 °F)   TempSrc: Oral   SpO2: 98%     No LMP for male patient.    Physical Exam  Vitals and nursing note reviewed.   Constitutional:       General: He is not in acute distress.     Appearance: Normal appearance. He is not ill-appearing.   HENT:      Head: Normocephalic and atraumatic.      Right Ear: Tympanic membrane normal.      Left Ear: Tympanic membrane normal.      Nose: Nose normal. No mucosal edema or congestion.      Right Sinus: No maxillary sinus tenderness or frontal sinus tenderness.      Left Sinus: No maxillary sinus tenderness or frontal sinus tenderness.      Mouth/Throat:      Lips: Pink.      Mouth: Mucous membranes are moist.      Pharynx: Oropharynx is clear. Uvula midline.   Cardiovascular:      Rate and Rhythm: Normal rate and regular rhythm.      Pulses: Normal pulses.      Heart sounds: Normal heart sounds, S1 normal and S2 normal.   Pulmonary:      Effort: Pulmonary effort is normal.      Breath sounds: Normal breath sounds. No wheezing, rhonchi or rales.   Abdominal:      General: Bowel sounds  are normal.      Palpations: Abdomen is soft.   Musculoskeletal:      Cervical back: Normal range of motion and neck supple.      Right lower leg: No edema.      Left lower leg: No edema.   Skin:     General: Skin is warm and dry.      Capillary Refill: Capillary refill takes less than 2 seconds.   Neurological:      General: No focal deficit present.      Mental Status: He is alert and oriented to person, place, and time. Mental status is at baseline.      Cranial Nerves: Cranial nerves 2-12 are intact.      Sensory: Sensation is intact.      Motor: Motor function is intact.      Coordination: Coordination is intact.      Gait: Gait is intact.   Psychiatric:         Behavior: Behavior normal.         Procedures    Point of Care Test & Imaging Results from this visit  No results found for this visit on 04/04/25.   Imaging  No results found.    Cardiology, Vascular, and Other Imaging  No other imaging results found for the past 2 days      Diagnostic study results (if any) were reviewed by LEEANNA Karimi.    Assessment/Plan   Allergies, medications, history, and pertinent labs/EKGs/Imaging reviewed by LEEANNA Karimi.     Medical Decision Making  Risks, benefits, and alternatives of the medications and treatment plan prescribed today were discussed, and patient expressed understanding. Plan follow up as discussed or as needed if any worsening symptoms or change in condition. Reinforced red flags including (but not limited to): severe or worsening pain; difficulty swallowing; stiff neck; shortness of breath; coughing or vomiting blood; chest pain; and new or increased fever are indications to go to the Emergency Department.  At time of discharge patient was clinically well-appearing and HDS for outpatient management. The patient and/or family was educated regarding diagnosis, supportive care, OTC and Rx medications. The patient and/or family was given the opportunity to ask questions prior  to discharge.  They verbalized understanding of my discussion of the plans for treatment, expected course, indications to return to  or seek further evaluation in ED, and the need for timely follow up as directed.   They were provided with a work/school excuse if requested. The after-visit summary was given to the patient and care instructions were reviewed with the patient. All questions were answered and the patient verbalized understanding of the plan of care for today.  Plan:  Recent visit notes reviewed  Meds as above  Increase clear fluids  Pcp follow up this week if not improving or worsening  ER visit anytime 24/7 for acute worsening or changing condition      Orders and Diagnoses  Diagnoses and all orders for this visit:  Other fatigue  -     Hemoglobin A1C  Nausea  -     Hemoglobin A1C  -     ondansetron ODT (Zofran-ODT) 4 mg disintegrating tablet; Dissolve 1 tablet (4 mg) in the mouth every 8 hours if needed for nausea or vomiting.  Nonintractable episodic headache, unspecified headache type  -     Hemoglobin A1C  Polyuria  -     Hemoglobin A1C  Polydipsia  -     Hemoglobin A1C  Other orders  -     Follow Up In Primary Care; Future      Medical Admin Record      Patient disposition: Home    Electronically signed by LEEANNA Karimi  3:35 PM

## 2025-04-08 ENCOUNTER — HOSPITAL ENCOUNTER (EMERGENCY)
Facility: HOSPITAL | Age: 60
Discharge: HOME | End: 2025-04-08
Attending: EMERGENCY MEDICINE
Payer: COMMERCIAL

## 2025-04-08 ENCOUNTER — APPOINTMENT (OUTPATIENT)
Dept: CARDIOLOGY | Facility: HOSPITAL | Age: 60
End: 2025-04-08
Payer: COMMERCIAL

## 2025-04-08 ENCOUNTER — APPOINTMENT (OUTPATIENT)
Dept: RADIOLOGY | Facility: HOSPITAL | Age: 60
End: 2025-04-08
Payer: COMMERCIAL

## 2025-04-08 VITALS
OXYGEN SATURATION: 97 % | HEIGHT: 69 IN | BODY MASS INDEX: 39.84 KG/M2 | RESPIRATION RATE: 18 BRPM | WEIGHT: 269 LBS | HEART RATE: 64 BPM | DIASTOLIC BLOOD PRESSURE: 67 MMHG | TEMPERATURE: 97.7 F | SYSTOLIC BLOOD PRESSURE: 135 MMHG

## 2025-04-08 DIAGNOSIS — R42 VERTIGO: Primary | ICD-10-CM

## 2025-04-08 LAB
ALBUMIN SERPL BCP-MCNC: 4.3 G/DL (ref 3.4–5)
ALP SERPL-CCNC: 70 U/L (ref 33–120)
ALT SERPL W P-5'-P-CCNC: 12 U/L (ref 10–52)
ANION GAP SERPL CALC-SCNC: 8 MMOL/L (ref 10–20)
AST SERPL W P-5'-P-CCNC: 14 U/L (ref 9–39)
BASOPHILS # BLD AUTO: 0.03 X10*3/UL (ref 0–0.1)
BASOPHILS NFR BLD AUTO: 0.3 %
BILIRUB SERPL-MCNC: 0.4 MG/DL (ref 0–1.2)
BUN SERPL-MCNC: 16 MG/DL (ref 6–23)
CALCIUM SERPL-MCNC: 9.5 MG/DL (ref 8.6–10.3)
CARDIAC TROPONIN I PNL SERPL HS: <3 NG/L (ref 0–20)
CARDIAC TROPONIN I PNL SERPL HS: <3 NG/L (ref 0–20)
CHLORIDE SERPL-SCNC: 106 MMOL/L (ref 98–107)
CO2 SERPL-SCNC: 31 MMOL/L (ref 21–32)
CREAT SERPL-MCNC: 0.98 MG/DL (ref 0.5–1.3)
EGFRCR SERPLBLD CKD-EPI 2021: 89 ML/MIN/1.73M*2
EOSINOPHIL # BLD AUTO: 0.01 X10*3/UL (ref 0–0.7)
EOSINOPHIL NFR BLD AUTO: 0.1 %
ERYTHROCYTE [DISTWIDTH] IN BLOOD BY AUTOMATED COUNT: 13.6 % (ref 11.5–14.5)
GLUCOSE SERPL-MCNC: 111 MG/DL (ref 74–99)
HCT VFR BLD AUTO: 45.3 % (ref 41–52)
HGB BLD-MCNC: 14.3 G/DL (ref 13.5–17.5)
IMM GRANULOCYTES # BLD AUTO: 0.04 X10*3/UL (ref 0–0.7)
IMM GRANULOCYTES NFR BLD AUTO: 0.4 % (ref 0–0.9)
INR PPP: 1.1 (ref 0.9–1.1)
LYMPHOCYTES # BLD AUTO: 1.35 X10*3/UL (ref 1.2–4.8)
LYMPHOCYTES NFR BLD AUTO: 14.6 %
MCH RBC QN AUTO: 30.4 PG (ref 26–34)
MCHC RBC AUTO-ENTMCNC: 31.6 G/DL (ref 32–36)
MCV RBC AUTO: 96 FL (ref 80–100)
MONOCYTES # BLD AUTO: 0.98 X10*3/UL (ref 0.1–1)
MONOCYTES NFR BLD AUTO: 10.6 %
NEUTROPHILS # BLD AUTO: 6.82 X10*3/UL (ref 1.2–7.7)
NEUTROPHILS NFR BLD AUTO: 74 %
NRBC BLD-RTO: 0 /100 WBCS (ref 0–0)
PLATELET # BLD AUTO: 238 X10*3/UL (ref 150–450)
POTASSIUM SERPL-SCNC: 4.2 MMOL/L (ref 3.5–5.3)
PROT SERPL-MCNC: 7 G/DL (ref 6.4–8.2)
PROTHROMBIN TIME: 12.4 SECONDS (ref 9.8–12.4)
RBC # BLD AUTO: 4.7 X10*6/UL (ref 4.5–5.9)
SODIUM SERPL-SCNC: 141 MMOL/L (ref 136–145)
WBC # BLD AUTO: 9.2 X10*3/UL (ref 4.4–11.3)

## 2025-04-08 PROCEDURE — 85610 PROTHROMBIN TIME: CPT | Performed by: EMERGENCY MEDICINE

## 2025-04-08 PROCEDURE — 36415 COLL VENOUS BLD VENIPUNCTURE: CPT | Performed by: EMERGENCY MEDICINE

## 2025-04-08 PROCEDURE — 71045 X-RAY EXAM CHEST 1 VIEW: CPT | Performed by: RADIOLOGY

## 2025-04-08 PROCEDURE — 70450 CT HEAD/BRAIN W/O DYE: CPT

## 2025-04-08 PROCEDURE — 93005 ELECTROCARDIOGRAM TRACING: CPT

## 2025-04-08 PROCEDURE — 71045 X-RAY EXAM CHEST 1 VIEW: CPT

## 2025-04-08 PROCEDURE — 2500000002 HC RX 250 W HCPCS SELF ADMINISTERED DRUGS (ALT 637 FOR MEDICARE OP, ALT 636 FOR OP/ED): Performed by: PHYSICIAN ASSISTANT

## 2025-04-08 PROCEDURE — 85025 COMPLETE CBC W/AUTO DIFF WBC: CPT | Performed by: EMERGENCY MEDICINE

## 2025-04-08 PROCEDURE — 84484 ASSAY OF TROPONIN QUANT: CPT | Performed by: EMERGENCY MEDICINE

## 2025-04-08 PROCEDURE — 80053 COMPREHEN METABOLIC PANEL: CPT | Performed by: EMERGENCY MEDICINE

## 2025-04-08 PROCEDURE — 70450 CT HEAD/BRAIN W/O DYE: CPT | Performed by: RADIOLOGY

## 2025-04-08 PROCEDURE — 99285 EMERGENCY DEPT VISIT HI MDM: CPT | Performed by: PHYSICIAN ASSISTANT

## 2025-04-08 PROCEDURE — 99285 EMERGENCY DEPT VISIT HI MDM: CPT | Mod: 25 | Performed by: EMERGENCY MEDICINE

## 2025-04-08 RX ORDER — MECLIZINE HYDROCHLORIDE 25 MG/1
25 TABLET ORAL 3 TIMES DAILY PRN
Qty: 30 TABLET | Refills: 0 | Status: SHIPPED | OUTPATIENT
Start: 2025-04-08 | End: 2025-04-18

## 2025-04-08 RX ORDER — MECLIZINE HYDROCHLORIDE 25 MG/1
25 TABLET ORAL ONCE
Status: COMPLETED | OUTPATIENT
Start: 2025-04-08 | End: 2025-04-08

## 2025-04-08 RX ADMIN — MECLIZINE HYDROCHLORIDE 25 MG: 25 TABLET ORAL at 16:27

## 2025-04-08 ASSESSMENT — LIFESTYLE VARIABLES
EVER HAD A DRINK FIRST THING IN THE MORNING TO STEADY YOUR NERVES TO GET RID OF A HANGOVER: NO
TOTAL SCORE: 0
HAVE PEOPLE ANNOYED YOU BY CRITICIZING YOUR DRINKING: NO
EVER FELT BAD OR GUILTY ABOUT YOUR DRINKING: NO
HAVE YOU EVER FELT YOU SHOULD CUT DOWN ON YOUR DRINKING: NO

## 2025-04-08 ASSESSMENT — PAIN SCALES - GENERAL: PAINLEVEL_OUTOF10: 0 - NO PAIN

## 2025-04-08 ASSESSMENT — PAIN - FUNCTIONAL ASSESSMENT: PAIN_FUNCTIONAL_ASSESSMENT: 0-10

## 2025-04-08 ASSESSMENT — VISUAL ACUITY: OU: 1

## 2025-04-08 ASSESSMENT — COLUMBIA-SUICIDE SEVERITY RATING SCALE - C-SSRS
6. HAVE YOU EVER DONE ANYTHING, STARTED TO DO ANYTHING, OR PREPARED TO DO ANYTHING TO END YOUR LIFE?: NO
1. IN THE PAST MONTH, HAVE YOU WISHED YOU WERE DEAD OR WISHED YOU COULD GO TO SLEEP AND NOT WAKE UP?: NO
2. HAVE YOU ACTUALLY HAD ANY THOUGHTS OF KILLING YOURSELF?: NO

## 2025-04-08 NOTE — ED PROVIDER NOTES
HPI   Chief Complaint   Patient presents with    Dizziness     Pt with intermittent dizziness, nausea, and night sweats for 4 months.        HPI  Patient is a 59-year-old male with no significant medical history that presents to the ED for evaluation of dizziness and nausea.  Patient reports about 4 months ago and February he started having episodes where he would have a dizzy sensation when waking up from sleep.  Symptoms were when he would sit up.  He has had the symptoms intermittently over the last couple of months.  He has had about 4 episodes total.  Went to an urgent care where he was given Zofran.  Patient reports today is the first day where symptoms started during the day.  He was sitting in the car eating lunch talking to his wife when he started having onset of dizziness and nausea.  Symptoms started today at around 12:45 PM.  He is still having symptoms although they are subsiding.  Early in disease onset he had associated night sweats/chills.  None today.  Denies fever, chills, nausea, vomiting, shortness of breath, chest pain, vision changes, injury, neck pain.  He works at [employer].  Denies having associated URI symptoms over the last couple months.      Patient History   No past medical history on file.  Past Surgical History:   Procedure Laterality Date    OTHER SURGICAL HISTORY  03/04/2020    Appendectomy     No family history on file.  Social History     Tobacco Use    Smoking status: Former     Types: Cigarettes     Passive exposure: Current    Smokeless tobacco: Never   Substance Use Topics    Alcohol use: Never    Drug use: Defer       Physical Exam   ED Triage Vitals [04/08/25 1456]   Temperature Heart Rate Respirations BP   36.5 °C (97.7 °F) 64 16 (!) 145/105      Pulse Ox Temp Source Heart Rate Source Patient Position   98 % Temporal Monitor Sitting      BP Location FiO2 (%)     Right arm --       Physical Exam  Vitals reviewed.   Constitutional:       General: He is not in acute  distress.     Appearance: Normal appearance. He is not ill-appearing, toxic-appearing or diaphoretic.   HENT:      Head: Normocephalic and atraumatic.      Right Ear: Tympanic membrane, ear canal and external ear normal.      Left Ear: Tympanic membrane, ear canal and external ear normal.      Nose: Nose normal. No congestion or rhinorrhea.      Mouth/Throat:      Mouth: Mucous membranes are moist.      Pharynx: Oropharynx is clear. No oropharyngeal exudate or posterior oropharyngeal erythema.   Eyes:      General: Vision grossly intact. No visual field deficit or scleral icterus.        Right eye: No foreign body or discharge.         Left eye: No foreign body or discharge.      Extraocular Movements: Extraocular movements intact.      Right eye: Nystagmus present. Normal extraocular motion.      Left eye: Nystagmus present. Normal extraocular motion.      Conjunctiva/sclera: Conjunctivae normal.      Right eye: Right conjunctiva is not injected. No chemosis, exudate or hemorrhage.     Left eye: Left conjunctiva is not injected. No chemosis, exudate or hemorrhage.     Pupils: Pupils are equal, round, and reactive to light.      Comments: Unidirectional horizontal nystagmus   Cardiovascular:      Rate and Rhythm: Normal rate and regular rhythm.      Pulses: Normal pulses.      Heart sounds: Normal heart sounds. No murmur heard.     No friction rub.   Pulmonary:      Effort: Pulmonary effort is normal. No respiratory distress.      Breath sounds: Normal breath sounds. No stridor. No wheezing, rhonchi or rales.   Musculoskeletal:      Cervical back: No rigidity or tenderness.      Right lower leg: No edema.      Left lower leg: No edema.   Lymphadenopathy:      Cervical: No cervical adenopathy.   Skin:     General: Skin is warm and dry.      Capillary Refill: Capillary refill takes less than 2 seconds.      Coloration: Skin is not jaundiced or pale.   Neurological:      General: No focal deficit present.      Mental  Status: He is alert and oriented to person, place, and time.      GCS: GCS eye subscore is 4. GCS verbal subscore is 5. GCS motor subscore is 6.      Cranial Nerves: No cranial nerve deficit, dysarthria or facial asymmetry.      Sensory: No sensory deficit.      Motor: No weakness, tremor, abnormal muscle tone or pronator drift.      Coordination: Coordination normal. Finger-Nose-Finger Test normal.      Gait: Gait normal.   Psychiatric:         Mood and Affect: Mood normal.         Behavior: Behavior normal.           ED Course & MDM   ED Course as of 04/08/25 1830   Tue Apr 08, 2025   1519 On my independent interpretation of EKG it shows normal sinus rhythm with a rate of 67 bpm.  QT/QTc 386/407.  QRS 92.  Normal axis and intervals.  No signs of ischemic changes. [HK]   1752 CT head wo IV contrast  IMPRESSION:  Benign-appearing osteoma left frontal sinus. Otherwise no significant  abnormality.      MACRO:   [HK]   1757 Presented case to Dr. Perez  []      ED Course User Index  [HK] Doni Spencer PA-C         Diagnoses as of 04/08/25 1830   Vertigo                 No data recorded     Pope Coma Scale Score: 15 (04/08/25 1457 : Samina Marquez RN)                           Medical Decision Making  Patient is a 59-year-old male with no significant medical history that presents to the ED for evaluation of dizziness and nausea.  On exam patient is uncomfortable appearing and in no acute distress.  Vital signs are stable.  Cardiopulmonary exam is largely unremarkable.  Neurologic exam is without deficits.  ENT exam is normal.  Exam significant for unidirectional horizontal nystagmus.  Differential includes but is not limited to peripheral vertigo, acute intracranial abnormality, atypical ACS, electrolyte normality, URI.  Patient symptoms not consistent with URI or labyrinthitis at this time.  No associated tinnitus or vision changes.  Patient staffed with ED attending physician.    Patient treated in ED  with Antivert 25 mg.  On reevaluation symptoms have resolved.  Delta troponins are negative.  ECG is nonischemic.  CBC without leukocytosis or anemia.  CMP with normal electrolytes and normal renal function.  Coags are normal.  Case was discussed with neurology on-call to help set up closer follow-up.  CT of the head was negative for acute process and showed a benign-appearing osteoma of the left frontal sinus.  Since patient is feeling better and able to ambulate he is okay for outpatient follow-up.    Social determinants of health affecting care:  None     I independently reviewed all imaging and labs.    Patient was informed of the aforementioned findings.  Symptoms are felt to be due to peripheral vertigo.  Patient will be prescribed Antivert.     At this time, low suspicion for an acute process that would necessitate further emergent workup, urgent specialist consultation, or hospitalization.  Based on clinical workup and discussion with attending physician, the disposition of discharge is appropriate.  Advised patient to pursue close follow-up with PCP.  Patient was provided with appropriate information to assist in obtaining the proper follow-up.  Discussed strict return to ED protocols with patient, including low threshold to return for changing/worsening symptoms.  Patient demonstrated understanding and agreement with the plan of care, and all questions answered prior to discharge.  Patient stable at time of discharge.     --------------------------------------------------------------------------------------------------------------------------------------------------------------------------------------------------------------------------    This note was dictated using a speech recognition program.  While an attempt was made at proof reading to minimize errors, minor errors in transcription may be present call for questions.     Procedure  Procedures     Doni Spencer PA-C  04/08/25 7105

## 2025-04-08 NOTE — DISCHARGE INSTRUCTIONS
Seek immediate medical attention if you develop: worsening dizziness, headache, worsening nausea, vomiting, confusion, weakness, loss of motion in your arms or legs, loss of control of your urine or stool, difficulty waking from sleep, neck pain, fever, or any new or worsening symptoms.

## 2025-04-11 LAB
ATRIAL RATE: 66 BPM
ATRIAL RATE: 67 BPM
P AXIS: 33 DEGREES
P AXIS: 36 DEGREES
P OFFSET: 202 MS
P OFFSET: 204 MS
P ONSET: 134 MS
P ONSET: 137 MS
PR INTERVAL: 178 MS
PR INTERVAL: 184 MS
Q ONSET: 226 MS
Q ONSET: 226 MS
QRS COUNT: 11 BEATS
QRS COUNT: 11 BEATS
QRS DURATION: 90 MS
QRS DURATION: 92 MS
QT INTERVAL: 386 MS
QT INTERVAL: 396 MS
QTC CALCULATION(BAZETT): 407 MS
QTC CALCULATION(BAZETT): 415 MS
QTC FREDERICIA: 400 MS
QTC FREDERICIA: 409 MS
R AXIS: 16 DEGREES
R AXIS: 18 DEGREES
T AXIS: 30 DEGREES
T AXIS: 31 DEGREES
T OFFSET: 419 MS
T OFFSET: 424 MS
VENTRICULAR RATE: 66 BPM
VENTRICULAR RATE: 67 BPM

## 2025-05-04 DIAGNOSIS — Z00.00 ROUTINE HEALTH MAINTENANCE: ICD-10-CM

## 2025-05-05 ENCOUNTER — OFFICE VISIT (OUTPATIENT)
Facility: CLINIC | Age: 60
End: 2025-05-05
Payer: COMMERCIAL

## 2025-05-05 ENCOUNTER — APPOINTMENT (OUTPATIENT)
Facility: CLINIC | Age: 60
End: 2025-05-05
Payer: COMMERCIAL

## 2025-05-05 ENCOUNTER — CLINICAL SUPPORT (OUTPATIENT)
Dept: AUDIOLOGY | Facility: CLINIC | Age: 60
End: 2025-05-05
Payer: COMMERCIAL

## 2025-05-05 ENCOUNTER — APPOINTMENT (OUTPATIENT)
Dept: AUDIOLOGY | Facility: CLINIC | Age: 60
End: 2025-05-05
Payer: COMMERCIAL

## 2025-05-05 VITALS
WEIGHT: 264 LBS | DIASTOLIC BLOOD PRESSURE: 86 MMHG | HEART RATE: 65 BPM | HEIGHT: 69 IN | SYSTOLIC BLOOD PRESSURE: 129 MMHG | BODY MASS INDEX: 39.1 KG/M2

## 2025-05-05 DIAGNOSIS — H90.3 SENSORINEURAL HEARING LOSS (SNHL) OF BOTH EARS: Primary | ICD-10-CM

## 2025-05-05 DIAGNOSIS — R42 DIZZINESS: ICD-10-CM

## 2025-05-05 DIAGNOSIS — R42 EPISODIC LIGHTHEADEDNESS: ICD-10-CM

## 2025-05-05 DIAGNOSIS — H90.3 ASYMMETRIC SNHL (SENSORINEURAL HEARING LOSS): Primary | ICD-10-CM

## 2025-05-05 PROCEDURE — 92557 COMPREHENSIVE HEARING TEST: CPT

## 2025-05-05 PROCEDURE — 92550 TYMPANOMETRY & REFLEX THRESH: CPT

## 2025-05-05 NOTE — PROGRESS NOTES
Patient ID: Alf Horton is a 59 y.o. male who presents for the evaluation of dizziness. Patient presents as a referral from ED provider Dr. Murali Mascorro. Patient accompanied to visit today by his wife, Lolly.    DIAGNOSES/PROBLEMS:  -Asymmetric sensorineural hearing loss (ASNHL)  -Episodic lightheadedness    PROVIDER IMPRESSIONS:  ASSESSMENT: Alf Horton is a pleasant 59 y.o. male with who presents for the initial evaluation of episodic, non-vertiginous, and non-positional dizziness/lightheadedness x 4 in the past 3 months. Associated symptoms that present include bilateral hearing loss (left>right), headaches, and with initial episode symptoms of diaphoresis and nausea with vomiting. The physiology of balance control was explained. The likely possible etiologies were reviewed. My impression is that the patient could have a peripheral vestibular disorder. Based on the clinical information provided, symptoms and clinical exam findings are consistent with asymmetric sensorineural hearing loss vs. Possible vestibular migraines. Otologic and balance testing exam findings today were overall unremarkable. Reassurance provided to patient that bilateral EAC appears with no sign of acute infection or inflammation and that bilateral TM appears with no sign of infection, effusion, retraction or perforation. Audiogram was reviewed in detail with the patient today, which revealed mild to moderate sensorineural hearing loss with excellent (92%) WRS in the right ear, and moderate to several sensorineural hearing loss with poor (60%) WRS in the left ear, with notable left nerve asymmetry  in WRS and with pure tone thresholds from 250-2000 Hz and 6432-5894 Hz. The patient was counseled on possible etiologies for asymmetry, including insult to auditory nerve from the following conditions: viral inflammation, vascular insufficiency, harmful noise exposure, and/or presence of neoplastic disease of the VII/VIII complex. Due to  asymmetry in hearing (greater than 30 dB on audiogram), I explained to the patient that imaging is necessary in order to rule out an acoustic neuroma and/or other retrocochlear pathology. Recent CT head from April 2025 was reviewed in detail and showed no sign of significant mastoid or middle ear effusion bilaterally. The patient was offered extensive counseling on the current clinical findings and management recommendations. Reassurance provided to patient that my suspicion is quite low for Meniere's disease at this time, especially given that patient's reported symptom presentation is not consistent with characteristic fluctuations in otologic symptoms [I.e. hearing loss] that coincide with dizziness episodes.    PLAN:  I recommended MRI IAC w/wo contrast to further evaluate ASNHL. Order for imaging e-submitted and patient provided copy of requisition with instructions to schedule imaging through  Radiology services. If there are other incidental findings, patient was reassured that appropriate follow up and referral will be made for further evaluation.  I recommended the patient initiates trial of migraine diet for management of possible vestibular migraines. Patient was provided counseling and an educational handout that summarizes the dietary guideline overview, including adherence to daily sodium intake restrictions, abundant daily water intake, and reduction of excessive caffeine/alcohol consumption. In order to better assess dietary outcomes/patterns, patient was advised to routinely track progress at home with a diary of their daily sodium/water intake and symptom characteristics.  Follow-up: Patient may schedule for follow-up in 4-6 weeks after obtaining MRI IAC to review the results and evaluate migraine diet treatment outcomes. May likely consider referral to neurology, pending follow-up results and outcomes [Patient already scheduled for [facility]y in September 2025]. Patient may follow up sooner,  if needed. Patient is agreeable to plan and all questions answered to patient's satisfaction.     At today's visit with Alf Horton, the number of minutes I spent providing patient care was 45. More than 50% of that time was spent counseling the patient on possible etiologies, test results, treatment options and care coordination.     Subjective   HPI: Alf Horton is a 59 y.o. male who is referred by ED for the initial evaluation of dizziness. Patient reports that in the past 3 months he has experienced 4 episodes of severe dizziness. He describes dizziness episodes of severe lightheadedness sensation as if his head is stuck in a bowl. States that episodes seem to occur at random, with each dizziness episode has lasted consecutively for 1-3 days. Reports that with every episode he experienced an associated headache. During initial dizziness episode in February, he experienced associated diaphoresis and felt motion sickness that resulted in nausea with vomiting. Patient denies true vertigo symptoms and denies presyncopal sensation due to dizziness. Patient states that abrupt head-positional movements in every direction can exacerbate lightheadedness/dizziness sensation, however, it does not trigger the onset of episodes. His most recent dizziness episode occurred on 4/8/25 while the patient was at work. Patient seen by ED on 4/8/25 and received cardiac workup and CT head which he was told were negative, was prescribed p.o. meclizine prn that he has taken several times with no symptom benefit. Mentions experiences a more continuous and mild lightheadedness sensation between episodes. Patient denies the presence of associated symptoms of tinnitus, ear pain, ear fullness/pressure, visual intolerance, sound/pressure-induced symptoms, ear drainage, or autophony. He reports a longstanding history of gradually progressive bilateral hearing loss, left ear worse than right. He denies any sudden hearing loss  progression or hearing fluctuations in the past 3 months. Denies significant hearing difficulty with daily communication. 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 Reports a remote history of severe left inner ear infection (20 yrs ago) with associated dizziness and syncopal episode, received MRI for workup at which he was told was negative. Patient denies any past medical history of ear surgeries, PE tube placement, recent falls, or exposure to ototoxic drugs/agents.  Patient denies a family history of hearing loss. He denies any past medical history of migraines, chronic neck tightness/injury, visual impairment, peripheral neuropathy, or autoimmune conditions Mentions he is scheduled for an upcoming new visit with neurology in September 2025 for symptoms.    PATIENT HISTORY:  Medical History[1]   Surgical History[2]   RX Allergies[3]   Current Medications[4]   Tobacco Use: Medium Risk (4/4/2025)    Patient History     Smoking Tobacco Use: Former     Smokeless Tobacco Use: Never     Passive Exposure: Current      Alcohol Use: Not on file      Social History     Substance and Sexual Activity   Drug Use Defer        REVIEW OF SYSTEMS:   All other systems negative.    Objective   Visit Vitals  Smoking Status Former      PHYSICAL EXAM:  BALANCE TESTING:   Bedside oculomotor function assessment for ocular pursuits and saccades, spontaneous nystagmus was normal.   Head Thrust Test: Normal  Romberg: Normal   Fukuda: Normal  Tandem gait: Normal   [place]-Hallpike maneuver: Negative bilaterally with no elicited vertigo/nystagmus in head-down positions x 2.    TUNING FORK EXAM (512 Hz fork):  [place]: Midline, does not lateralize  Rinne: AC>BC bilaterally    Right Ear: External inspection of ear with no deformity, scars, or masses. EAC is clear.   TM is intact with no sign of infection, effusion, or retraction.  No perforation seen.  Left Ear: External inspection of ear with no deformity, scars, or masses. EAC is clear.  TM is intact with no sign of infection, effusion, or retraction.  No perforation seen.  TMJ: Normal, no trismus.  Oral Cavity/Mouth: Oral cavity and oropharynx mucosa moist and pink. No lesions or masses. Dentition normal. Tonsils appear normal. Uvula is midline. Tongue with no masses or lesions. Tongue with good mobility. The oropharynx is clear.  Nose: External inspection of nose: No nasal lesions, lacerations or scars. Anterior rhinoscopy with limited visualization past the inferior turbinates revealed normal size of inferior nasal turbinates, the septum was midline. No tenderness on frontal or maxillary sinus palpation  Head: Atraumatic with no masses, lesions or scarring.  Face: Normal symmetry. No scars or deformities.  Constitutional: No fever, chills, weight loss or weight gain  General appearance: Appears well, well-nourished, well groomed. No acute distress.   Communication: Normal communication  Psychiatric: Oriented to person, place and time. Normal mood and affect.  Neurologic: Cranial nerves II-XII grossly intact and symmetric bilaterally.  Eyes: Conjunctiva not edematous or erythematous. PERRLA  Neck: Normal appearing, symmetric, trachea midline.   Cardiovascular: Examination of peripheral vascular system shows no clubbing or cyanosis.  Respiratory: No respiratory distress increased work of breathing. Inspection of the chest with symmetric chest expansion and normal respiratory effort.  Skin: No head and neck rashes.  Lymph nodes: No adenopathy.     RESULTS:  -I personally reviewed the patient's audiogram from 05/05/25 with the following results: Right ear with mild sensorineural hearing loss through 250 Hz, sloping to moderate sensorineural hearing loss above. Left ear with moderate sensorineural hearing loss through 750 Hz,  sloping to severe mixed hearing loss notched at 1000 Hz, rising to moderate essentially flat SNHL through 6000 Hz, sloping to severe SNHL above. Right ear with normal tympanogram, and left ear with normal tympanogram. Right ear with 92% WRS at 90 dB, and left ear with 60% WRS at 95 dB. Acoustic reflexes present (except absent at 2000/4000 Hz) right ipsilateral, and present (except absent at 4000 Hz) left ipsilateral.    -I personally reviewed the patient's CT head wo contrast from 4/8/25, with the following summary of reported findings: Visualized paranasal sinuses and mastoids demonstrate no fluid or significant mucosal thickening. Benign-appearing osteoma left frontal sinus.      Cyndee Patino, APRN-CNP        [1] No past medical history on file.  [2]   Past Surgical History:  Procedure Laterality Date    OTHER SURGICAL HISTORY  03/04/2020    Appendectomy   [3]   Allergies  Allergen Reactions    Penicillins Hives and Rash     Hives   [4]   Current Outpatient Medications:     diclofenac sodium (Voltaren) 1 % gel, APPLY 2 GRAMS TOPICALLY TO THE AFFECTED AREA THREE TIMES DAILY, Disp: , Rfl:     ondansetron ODT (Zofran-ODT) 4 mg disintegrating tablet, Dissolve 1 tablet (4 mg) in the mouth every 8 hours if needed for nausea or vomiting., Disp: 10 tablet, Rfl: 0

## 2025-05-05 NOTE — PROGRESS NOTES
AUDIOLOGIC EVALUATION    HISTORY  Alf Horton, 59 y.o., was seen today, 5/5/2025, for an initial audiologic evaluation at the request of Cyndee Patino APRN.CNP.    The following case history was obtained from the patient during today's visit on 5/5/2025  Hearing Concerns? No  Patient has known hearing loss, no concerns for changes in his hearing  Patient reported his most recent hearing test was about 3-4 years ago at an outside facility   Hearing Device Use? Yes   Patient utilizes binaural ANTONINO hearing aids obtained from [facility] about 3-4 years ago    Tinnitus? Unknown   Otalgia? None reported at this time   Aural Pressure/Fullness? Slight  Patient unable to determine laterality   Recent Ear Infections/Otorrhea? None reported at this time   Dizziness? Yes   Onset: Mid February  Description: Occurs upon wakening from sleep. Initial time associated with cold sweats, vomiting, nausea. (Following, only reports dizziness and lightheadedness)  Occurrence: Has happened 5 times total from mid February - early April.    History of ear surgeries? 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 Unknown   Other significant history? None reported at this time     ASSESSMENT  Otoscopy  Right Ear: Ear canal clear, tympanic membrane visualized.  Left Ear: Ear canal clear, tympanic membrane visualized.    Tympanometry (226 Hz):   Right Ear: Type A, middle ear pressure and tympanic membrane compliance within normal limits  Left Ear: Type A, middle ear pressure and tympanic membrane compliance within normal limits    Acoustic Reflexes:   (Probe) Right Ear: Ipsilateral - present from 500-1000 Hz only  (Probe) Left Ear: ipsilateral - preset from 500-2000 Hz only    (Probe) Right Ear: Contralateral - present from 2286-9492 Hz only  (Probe) Left Ear: Contralateral - absent at all test frequencies    Pure Tone Audiometry:     Right Ear: Mild sloping  to an essentially moderately-severe sensorineural hearing loss from 125-8000 Hz  Left Ear: Moderately-severe to severe sensorineural hearing loss from 125-8000 Hz    Speech Audiometry:   Right Ear: Obtained at 45 dB HL; good agreement with pure tone average  Left Ear:  Obtained at 55 dB HL; good agreement with pure tone average    Speech Perception  Right Ear: excellent (92%) at 90 dB HL; based on [facility] Auditory Test No.6 (NU-6) Ordered by difficulty (N=10).  Left Ear: poor (60%) at 95 dB HL; based on [facility] Auditory Test No.6 (NU-6) (N=25).     Test technique: Conventional Audiometry via insert earphones.   Reliability:  good    Comparison of today's results with previous test results: No previous results available.    IMPRESSIONS  Mild sloping to an essentially moderately-severe sensorineural hearing loss in the right ear   Moderately-severe to severe sensorineural hearing loss in the left ear  Normal middle ear status in both ears    RECOMMENDATIONS  Follow-up with ENT/Otolayrngology, as scheduled for today  Repeat hearing per ENT/Otolaryngology or no later than 12 months to monitor    ESTEBAN Murray CCC-A  Clinical Audiologist    Time: 0985-6739  Today's results were discussed with patient and family. Patient reported understanding of today's results and agreement with care plan. Please see the audiogram for today's visit below.     AUDIOGRAM

## 2025-05-11 LAB
CHOLEST SERPL-MCNC: 172 MG/DL
CHOLEST/HDLC SERPL: 4 (CALC)
HDLC SERPL-MCNC: 43 MG/DL
LDLC SERPL CALC-MCNC: 115 MG/DL (CALC)
NONHDLC SERPL-MCNC: 129 MG/DL (CALC)
TRIGL SERPL-MCNC: 62 MG/DL

## 2025-05-16 ENCOUNTER — APPOINTMENT (OUTPATIENT)
Dept: PRIMARY CARE | Facility: CLINIC | Age: 60
End: 2025-05-16
Payer: COMMERCIAL

## 2025-05-16 VITALS
OXYGEN SATURATION: 94 % | RESPIRATION RATE: 16 BRPM | BODY MASS INDEX: 39.78 KG/M2 | TEMPERATURE: 98.2 F | HEART RATE: 73 BPM | SYSTOLIC BLOOD PRESSURE: 117 MMHG | DIASTOLIC BLOOD PRESSURE: 73 MMHG | HEIGHT: 69 IN | WEIGHT: 268.6 LBS

## 2025-05-16 DIAGNOSIS — Z12.11 SCREENING FOR COLON CANCER: ICD-10-CM

## 2025-05-16 DIAGNOSIS — E78.5 HYPERLIPIDEMIA, UNSPECIFIED HYPERLIPIDEMIA TYPE: Primary | ICD-10-CM

## 2025-05-16 PROCEDURE — 99213 OFFICE O/P EST LOW 20 MIN: CPT | Performed by: STUDENT IN AN ORGANIZED HEALTH CARE EDUCATION/TRAINING PROGRAM

## 2025-05-16 PROCEDURE — 3008F BODY MASS INDEX DOCD: CPT | Performed by: STUDENT IN AN ORGANIZED HEALTH CARE EDUCATION/TRAINING PROGRAM

## 2025-05-16 PROCEDURE — 1036F TOBACCO NON-USER: CPT | Performed by: STUDENT IN AN ORGANIZED HEALTH CARE EDUCATION/TRAINING PROGRAM

## 2025-05-16 ASSESSMENT — PAIN SCALES - GENERAL: PAINLEVEL_OUTOF10: 0-NO PAIN

## 2025-05-16 ASSESSMENT — PATIENT HEALTH QUESTIONNAIRE - PHQ9
2. FEELING DOWN, DEPRESSED OR HOPELESS: NOT AT ALL
1. LITTLE INTEREST OR PLEASURE IN DOING THINGS: NOT AT ALL
SUM OF ALL RESPONSES TO PHQ9 QUESTIONS 1 AND 2: 0

## 2025-05-16 NOTE — PROGRESS NOTES
Subjective   Patient ID: Alf Horton is a 59 y.o. male who presents for Follow-up (3 month f/u).  History of Present Illness    Alf Horton is a 59 year old male who presents for a three-month follow-up to review lipid panel results. He is accompanied by his wife, Lolly.    A repeat lipid panel conducted a few days ago shows total cholesterol at 174 mg/dL, HDL at 47.4 mg/dL, and LDL slightly elevated above 100 mg/dL. VLDL and triglycerides are within normal range. He has been actively managing his diet by keeping sodium intake under 2000 mg per day and avoiding processed foods since last year. Additionally, he incorporates biking into his nightly exercise routine.    He experiences dizzy spells, which were previously attributed to ear-related issues. Over the past three to four weeks, he has noticed a reduction in dizziness. An ear specialist advised monitoring sodium intake to manage potential vascular migraines.    He has a history of leukopenia and does not have diabetes. He does not smoke and has no family history of heart attacks, although his father has coronary artery disease. He has lost weight, reducing from 290 lbs two years ago to 268 lbs currently, through dietary changes and increased physical activity.    No shortness of breath, chest pain, or issues with physical exertion such as climbing stairs or walking more than 500 feet. 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    He has a history of trigger finger, which was treated by orthopedics and is currently asymptomatic. No current issues with his shoulder or right foot pain, which were previously problematic.    Review of Systems  Negative otherwise noted above.     Objective     /73 (BP Location: Left arm, Patient Position: Sitting, BP Cuff Size: Adult)   Pulse 73   Temp 36.8 °C (98.2 °F) (Skin)   Resp 16   Ht 1.753 m  "(5' 9\")   Wt 122 kg (268 lb 9.6 oz)   SpO2 94%   BMI 39.67 kg/m²      Physical Exam  VITALS: BP- 117/  MEASUREMENTS: Weight- 268.  GENERAL: Alert, cooperative, well developed, no acute distress, no diaphoresis.  CHEST: Clear to auscultation bilaterally, no wheezes, rhonchi, or rales.  CARDIOVASCULAR: Normal heart rate and rhythm, S1 and S2 normal without murmurs, rubs, or gallops. EKG normal, no myocardial infarction or major issues.  EXTREMITIES: No cyanosis or edema.  NEUROLOGICAL: Moves all extremities without gross motor deficit.    Assessment & Plan  Hyperlipidemia  Lipid panel shows total cholesterol of 174, HDL of 47.4, and LDL slightly elevated above 100. VLDL and triglycerides are within normal range. ASCVD 10-year risk is 6.0%, indicating moderate risk. He is hesitant to start statin therapy due to potential side effects such as liver enzyme elevation, diabetes, and muscle breakdown. Discussed CT scan for coronary artery calcium score to evaluate coronary artery disease risk. Statin therapy may be reconsidered if calcium score is elevated.  - Order CT scan of the chest to assess coronary artery calcium score.  - Consider starting rosuvastatin 5 mg daily if calcium score indicates coronary artery disease.  - Repeat liver enzymes and cholesterol panel in 3 months if statin therapy is initiated.    Dizziness  Reports dizziness, suspected to be related to nerve damage from a past inner ear infection. Managing sodium intake to address potential vascular migraines. No recent episodes of dizziness reported in the last few weeks.  - Maintain sodium intake under 2000 mg per day.  - Follow up with neurologist in September for further evaluation.  - MRI ordered by ENT    Leukopenia-normalized on repeat.     General Health Maintenance  Discussed colon cancer screening options. Cologuard is a non-invasive option for average-risk patients. Explained the process and insurance considerations.  - Order Cologuard test " for colon cancer screening.  - Discuss insurance coverage for Cologuard and potential need for colonoscopy if results are positive.    Health Maintenance  -Prostate Cancer Screening: Normal 12/24  -Vaccinations: Recommend flu vaccine, shingrix, TDAP. Reports had TDAP in last 10 years.   -Lung Cancer Screening: Not indicated  -AAA Screening: Not indicated  -Colonoscopy: Cologuard ordered  -Screen for HIV/Hep C: Normal 12/24  -Advance Care Planning: He is ok with wife Lolly to make decisions if he is unable to.     F/u 3 months, sooner prn           Results  LABS  Total cholesterol: 174 mg/dL (05/13/2025)  HDL: 47.4 mg/dL (05/13/2025)  LDL: 115 mg/dL (05/13/2025)  VLDL: within normal limits (05/13/2025)  Triglycerides: within normal limits (05/13/2025)    DIAGNOSTIC  EKG: Normal (02/2025)       Andrez Akers DO     This medical note was created with the assistance of artificial intelligence (AI) for documentation purposes. The content has been reviewed and confirmed by the healthcare provider for accuracy and completeness. Patient consented to the use of audio recording and use of AI during their visit.

## 2025-05-17 PROBLEM — Z12.11 SCREENING FOR COLON CANCER: Status: ACTIVE | Noted: 2025-05-17

## 2025-05-20 ENCOUNTER — HOSPITAL ENCOUNTER (OUTPATIENT)
Dept: RADIOLOGY | Facility: CLINIC | Age: 60
Discharge: HOME | End: 2025-05-20
Payer: COMMERCIAL

## 2025-05-20 DIAGNOSIS — R42 EPISODIC LIGHTHEADEDNESS: ICD-10-CM

## 2025-05-20 DIAGNOSIS — H90.3 ASYMMETRIC SNHL (SENSORINEURAL HEARING LOSS): ICD-10-CM

## 2025-05-20 PROCEDURE — 2550000001 HC RX 255 CONTRASTS: Mod: JZ

## 2025-05-20 PROCEDURE — A9575 INJ GADOTERATE MEGLUMI 0.1ML: HCPCS | Mod: JZ

## 2025-05-20 PROCEDURE — 70553 MRI BRAIN STEM W/O & W/DYE: CPT

## 2025-05-20 PROCEDURE — 70553 MRI BRAIN STEM W/O & W/DYE: CPT | Performed by: RADIOLOGY

## 2025-05-20 RX ORDER — GADOTERATE MEGLUMINE 376.9 MG/ML
20 INJECTION INTRAVENOUS
Status: COMPLETED | OUTPATIENT
Start: 2025-05-20 | End: 2025-05-20

## 2025-05-20 RX ADMIN — GADOTERATE MEGLUMINE 20 ML: 376.9 INJECTION INTRAVENOUS at 09:50

## 2025-05-27 LAB — NONINV COLON CA DNA+OCC BLD SCRN STL QL: NEGATIVE

## 2025-06-05 NOTE — PROGRESS NOTES
Patient ID: Alf Horton is a 59 y.o. male who presents for virtual follow-up visit.     An interactive audio and video telecommunication system which permits real time communications between the patient (at the originating site) and provider (at the distant site) was utilized to provide this telehealth service. Verbal consent was requested and obtained from Alf Horton on this date, 06/06/25 for a telehealth visit and the patient's location was confirmed at the time of the visit.     PROVIDER IMPRESSIONS:  DIAGNOSES/PROBLEMS:  -Asymmetric sensorineural hearing loss  -Episodic lightheadedness    ASSESSMENT:   Alf Horton is a pleasant 59 y.o. male with a history of ASNHL with current use of binaural hearing aids who virtually presents for subsequent evaluation of episodic, non-vertiginous, and non-positional dizziness/lightheadedness and associated symptoms of bilateral hearing loss (left>right), headaches, nausea with vomiting, and diaphoresis. The patient endorses that symptoms are mildly improved since previous visit since initiating adherence to migraine diet recommendations. Based on the clinical information provided, symptoms and clinical exam findings may likely be more consistent with vestibular migraine vs. Other neurologic related conditions. Recent MRI IAC w/wo contrast results reviewed in detail with the patient, which revealed no focal lesions along the VII/ VIII cranial nerve complex bilaterally and no significant middle ear or mastoid effusion bilaterally, with some other neurologic findings noted. Patient offered reassurance and counseling on the current clinical findings and management recommendations as detailed below.     PLAN:  I recommended referral to neurology for further E/M of dizziness related to vestibular/other migraines vs. abnormal neurologic findings on recent MRI IAC. Referral declined by patient as he is already scheduled for upcoming  neurology NPV in July  2025.  Patient advised to wear ear hearing protection while in the presence of loud sounds. The patient was also counseled to use effective communication strategies such as asking the speaker to gain attention prior to speaking, speaking in the same room, repeating words that were heard, etc.   Patient advised to obtain repeat audiogram every 1-2 yrs for routine hearing surveillance, sooner for any new/progressive otologic symptoms.  Patient encouraged to continue follow-up with established audiologist for routine binaural hearing aid device adjustments/maintenance. We discussed consideration for BICROS devices vs. CI in the future if he experiences further left hearing loss progression and/or inadequate hearing benefit with current left hearing aid.   I recommended patient continues with consistent adherence to migraine diet with tracking/log of dizziness symptoms and daily sodium/water intake.   Follow-up: Patient may schedule for follow-up with me as needed. He may contact my office within 1 yr of this visit date to request my completion of a medical hearing aid clearance form, if needed. Patient is agreeable to this plan, all questions were answered to patient's satisfaction.     At today's virtual visit with Alf Horton , the number of minutes I spent providing patient care was 11. More than 50% of that time was spent counseling the patient on possible etiologies, test results, treatment options and care coordination.    Subjective   HPI: Alf Horton is a 59 y.o. male who presents for a virtual follow-up evaluation for episodic, non-vertiginous, and non-positional dizziness/lightheadedness. Patient also presents to review recent MRI IAC results for ASNHL (left>right). Since last visit on 5/5/25, the patient reports only experiencing 1 episode of dizziness. He feels that symptoms seem more controlled. Reports that episode occurred on Memorial day and lasted consecutively for 2-3 hours. Patient has been  consistent with recommended migraine diet and tracking daily sodium/fluid intake for the past 1 month. Denies any abrupt fluctuations in sodium or fluid intake around the time of recent dizziness episode onset. Mentions he recently obtained an o.t.c. migraine cap to utilize during episodes. He has also scheduled for sooner visit with neurology in July 2025 for dizziness symptoms. Denies any recent hearing loss progression or hearing fluctuations since prior visit. Denies any new symptoms of tinnitus, ear pain, ear fullness/pressure, or otorrhea. He has been consistent with wearing his current pair of binaural hearing aids with good hearing benefit, however, at times he feels that devices can be uncomfortable to wear for longer periods of time in the ears.       RECALL 5/5/25:  HPI: Alf Horton is a 59 y.o. male who is referred by ED for the initial evaluation of dizziness. Patient reports that in the past 3 months he has experienced 4 episodes of severe dizziness. He describes dizziness episodes of severe lightheadedness sensation as if his head is stuck in a bowl. States that episodes seem to occur at random, with each dizziness episode has lasted consecutively for 1-3 days. Reports that with every episode he experienced an associated headache. During initial dizziness episode in February, he experienced associated diaphoresis and felt motion sickness that resulted in nausea with vomiting. Patient denies true vertigo symptoms and denies presyncopal sensation due to dizziness. Patient states that abrupt head-positional movements in every direction can exacerbate lightheadedness/dizziness sensation, however, it does not trigger the onset of episodes. His most recent dizziness episode occurred on 4/8/25 while the patient was at work. Patient seen by ED on 4/8/25 and received cardiac workup and CT head which he was told were negative, was prescribed p.o. meclizine prn that he has taken several times with no symptom  benefit. Mentions experiences a more continuous and mild lightheadedness sensation between episodes. Patient denies the presence of associated symptoms of tinnitus, ear pain, ear fullness/pressure, visual intolerance, sound/pressure-induced symptoms, ear drainage, or autophony. He reports a longstanding history of gradually progressive bilateral hearing loss, left ear worse than right. He denies any sudden hearing loss progression or hearing fluctuations in the past 3 months. Denies significant hearing difficulty with daily communication. 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 Reports a remote history of severe left inner ear infection (20 yrs ago) with associated dizziness and syncopal episode, received MRI for workup at which he was told was negative. Patient denies any past medical history of ear surgeries, PE tube placement, recent falls, or exposure to ototoxic drugs/agents.  Patient denies a family history of hearing loss. He denies any past medical history of migraines, chronic neck tightness/injury, visual impairment, peripheral neuropathy, or autoimmune conditions Mentions he is scheduled for an upcoming new visit with neurology in September 2025 for symptoms.   ASSESSMENT: Alf Horton is a pleasant 59 y.o. male with who presents for the initial evaluation of episodic, non-vertiginous, and non-positional dizziness/lightheadedness x 4 in the past 3 months. Associated symptoms that present include bilateral hearing loss (left>right), headaches, and with initial episode symptoms of diaphoresis and nausea with vomiting. The physiology of balance control was explained. The likely possible etiologies were reviewed. My impression is that the patient could have a peripheral vestibular disorder. Based on the clinical  information provided, symptoms and clinical exam findings are consistent with asymmetric sensorineural hearing loss vs. Possible vestibular migraines. Otologic and balance testing exam findings today were overall unremarkable. Reassurance provided to patient that bilateral EAC appears with no sign of acute infection or inflammation and that bilateral TM appears with no sign of infection, effusion, retraction or perforation. Audiogram was reviewed in detail with the patient today, which revealed mild to moderate sensorineural hearing loss with excellent (92%) WRS in the right ear, and moderate to several sensorineural hearing loss with poor (60%) WRS in the left ear, with notable left nerve asymmetry  in WRS and with pure tone thresholds from 250-2000 Hz and 8624-7917 Hz. The patient was counseled on possible etiologies for asymmetry, including insult to auditory nerve from the following conditions: viral inflammation, vascular insufficiency, harmful noise exposure, and/or presence of neoplastic disease of the VII/VIII complex. Due to asymmetry in hearing (greater than 30 dB on audiogram), I explained to the patient that imaging is necessary in order to rule out an acoustic neuroma and/or other retrocochlear pathology. Recent CT head from April 2025 was reviewed in detail and showed no sign of significant mastoid or middle ear effusion bilaterally. The patient was offered extensive counseling on the current clinical findings and management recommendations. Reassurance provided to patient that my suspicion is quite low for Meniere's disease at this time, especially given that patient's reported symptom presentation is not consistent with characteristic fluctuations in otologic symptoms [I.e. hearing loss] that coincide with dizziness episodes.  PLAN:  I recommended MRI IAC w/wo contrast to further evaluate ASNHL. Order for imaging e-submitted and patient provided copy of requisition with instructions to schedule  imaging through  Radiology services. If there are other incidental findings, patient was reassured that appropriate follow up and referral will be made for further evaluation.  I recommended the patient initiates trial of migraine diet for management of possible vestibular migraines. Patient was provided counseling and an educational handout that summarizes the dietary guideline overview, including adherence to daily sodium intake restrictions, abundant daily water intake, and reduction of excessive caffeine/alcohol consumption. In order to better assess dietary outcomes/patterns, patient was advised to routinely track progress at home with a diary of their daily sodium/water intake and symptom characteristics.  Follow-up: Patient may schedule for follow-up in 4-6 weeks after obtaining MRI IAC to review the results and evaluate migraine diet treatment outcomes. May likely consider referral to neurology, pending follow-up results and outcomes [Patient already scheduled for NPV neurology in September 2025]. Patient may follow up sooner, if needed. Patient is agreeable to plan and all questions answered to patient's satisfaction.     PATIENT HISTORY:  Medical History[1]   Surgical History[2]   RX Allergies[3]   Current Medications[4]   Tobacco Use: Medium Risk (5/17/2025)    Patient History     Smoking Tobacco Use: Former     Smokeless Tobacco Use: Never     Passive Exposure: Current      Alcohol Use: Not on file      Social History     Substance and Sexual Activity   Drug Use Never        Review of Systems   All other systems negative.     Objective     TELEHEALTH PHYSICAL EXAM:  PSYCH: Alert and oriented with appropriate mood and affect  VOICE: No hoarseness or other audible abnormality  RESPIRATION: Breathing comfortably, no stridor; normal breathing effort  CONSTITUTIONAL: No acute distress, normal facial features; No fever; no chills  CV: No cyanosis visible on the face and neck area  EYES: Pupils equal and  round; no erythema; conjunctiva clear; sclera white  NEURO: Alert and oriented, able to raise eyebrows symmetrical bilateral, smile with no facial droop, able to swallow  HEAD AND FACE: Symmetric facial features, no masses or lesions Right ear examination: External ear normal. Left ear examination: External ear normal.  NOSE: External nose midline  ORAL CAVITY: No lesions of external lips; uvula is midline; tongue with good mobility; no gross mass in oral cavity; mucosa appears pink  NECK/LYMPH: No obvious deformity or lesions; trachea is midline     RESULTS:  -I personally reviewed the patient's MRI IAC w/wo contrast from 5/20/25, with the following summary of findings reported: No focal lesions along the VII/ VIII cranial nerve complex bilaterally and no significant middle ear or mastoid effusion bilaterally. There are some neurologic findings noted.      Cyndee Patino, APRN-CNP          [1] No past medical history on file.  [2]   Past Surgical History:  Procedure Laterality Date    OTHER SURGICAL HISTORY  03/04/2020    Appendectomy   [3]   Allergies  Allergen Reactions    Penicillins Hives and Rash     Hives   [4]   Current Outpatient Medications:     diclofenac sodium (Voltaren) 1 % gel, APPLY 2 GRAMS TOPICALLY TO THE AFFECTED AREA THREE TIMES DAILY, Disp: , Rfl:     ondansetron ODT (Zofran-ODT) 4 mg disintegrating tablet, Dissolve 1 tablet (4 mg) in the mouth every 8 hours if needed for nausea or vomiting., Disp: 10 tablet, Rfl: 0

## 2025-06-06 ENCOUNTER — APPOINTMENT (OUTPATIENT)
Facility: CLINIC | Age: 60
End: 2025-06-06
Payer: COMMERCIAL

## 2025-06-06 DIAGNOSIS — R42 EPISODIC LIGHTHEADEDNESS: ICD-10-CM

## 2025-06-06 DIAGNOSIS — H90.3 ASYMMETRIC SNHL (SENSORINEURAL HEARING LOSS): Primary | ICD-10-CM

## 2025-06-06 PROCEDURE — 1036F TOBACCO NON-USER: CPT

## 2025-06-06 PROCEDURE — 99212 OFFICE O/P EST SF 10 MIN: CPT

## 2025-06-11 ENCOUNTER — APPOINTMENT (OUTPATIENT)
Dept: RADIOLOGY | Facility: HOSPITAL | Age: 60
End: 2025-06-11
Payer: COMMERCIAL

## 2025-06-11 DIAGNOSIS — E78.5 HYPERLIPIDEMIA, UNSPECIFIED HYPERLIPIDEMIA TYPE: ICD-10-CM

## 2025-06-11 PROCEDURE — 75571 CT HRT W/O DYE W/CA TEST: CPT

## 2025-07-07 ENCOUNTER — APPOINTMENT (OUTPATIENT)
Dept: OTOLARYNGOLOGY | Facility: CLINIC | Age: 60
End: 2025-07-07
Payer: COMMERCIAL

## 2025-07-30 ENCOUNTER — APPOINTMENT (OUTPATIENT)
Dept: OTOLARYNGOLOGY | Facility: CLINIC | Age: 60
End: 2025-07-30
Payer: COMMERCIAL

## 2025-08-06 ENCOUNTER — OFFICE VISIT (OUTPATIENT)
Dept: URGENT CARE | Age: 60
End: 2025-08-06
Payer: COMMERCIAL

## 2025-08-06 VITALS
RESPIRATION RATE: 16 BRPM | WEIGHT: 262 LBS | OXYGEN SATURATION: 97 % | BODY MASS INDEX: 38.8 KG/M2 | DIASTOLIC BLOOD PRESSURE: 83 MMHG | HEART RATE: 78 BPM | TEMPERATURE: 97.9 F | SYSTOLIC BLOOD PRESSURE: 127 MMHG | HEIGHT: 69 IN

## 2025-08-06 DIAGNOSIS — M62.838 MUSCLE SPASM OF SHOULDER REGION: Primary | ICD-10-CM

## 2025-08-06 PROCEDURE — 3008F BODY MASS INDEX DOCD: CPT | Performed by: FAMILY MEDICINE

## 2025-08-06 PROCEDURE — 99213 OFFICE O/P EST LOW 20 MIN: CPT | Performed by: FAMILY MEDICINE

## 2025-08-06 RX ORDER — CYCLOBENZAPRINE HCL 5 MG
5 TABLET ORAL NIGHTLY PRN
Qty: 21 TABLET | Refills: 0 | Status: SHIPPED | OUTPATIENT
Start: 2025-08-06 | End: 2025-08-13

## 2025-08-06 RX ORDER — METHYLPREDNISOLONE 4 MG/1
TABLET ORAL
Qty: 21 TABLET | Refills: 0 | Status: SHIPPED | OUTPATIENT
Start: 2025-08-06

## 2025-08-06 ASSESSMENT — PAIN SCALES - GENERAL: PAINLEVEL_OUTOF10: 4

## 2025-08-06 NOTE — PATIENT INSTRUCTIONS
-RIGHT UPPER ARM PAIN DUE TO OVERUSE INJURY: Your right arm pain is likely due to repetitive work-related activities, which can cause muscle strain or localized inflammation. To help manage the pain, you have been prescribed a muscle relaxer and an anti-inflammatory medication. You should also apply heat therapy to the affected area and consider using a massage or massage gun. Your prescriptions have been sent to [facility] pharmacy.  When using a muscle relaxer, use it after dinnertime as this may make you sleepy.  INSTRUCTIONS:  Please follow up if your symptoms do not improve or if you experience any side effects from the medications. If the pain persists, further evaluation may be necessary.

## 2025-08-06 NOTE — PROGRESS NOTES
Subjective   Patient ID: Alf Horton is a 60 y.o. male. They present today with a chief complaint of Right Arm Pain (Began 3-4 weeks ago, pain is on and off. Throbbing pain, can't lay on arm, more pain when patient tries to reach).    Patient disposition: Home    History of Present Illness  HPI  History of Present Illness  Alf Horton is a 60 year old male who presents with right arm pain.    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    Prior treatments and response  - Ibuprofen and naproxen used without significant relief  - Topical steroid gel applied without benefit  - No use of muscle relaxers for this issue    Shoulder pain history  - Cortisone injection received in March or April for an irritated rotator cuff into her right shoulder  - Current right arm pain is distinct from previous shoulder issue    Associated symptoms and relevant negatives  - No history of back or neck issues            Past Medical History  Allergies as of 08/06/2025 - Reviewed 08/06/2025   Allergen Reaction Noted    Penicillins Hives and Rash 10/12/2012       Current Medications[1]    Problem List[2]    Surgical History[3]     reports that he has quit smoking. His smoking use included cigarettes. He has been exposed to tobacco smoke. He has never used smokeless tobacco. He reports that he does not drink alcohol and does not use drugs.    Review of Systems  As noted in HPI. ROS otherwise negative unless noted.       Objective    Vitals:    08/06/25 1708   BP: 127/83   Pulse: 78   Resp: 16   Temp: 36.6 °C (97.9 °F)   TempSrc: Oral  "  SpO2: 97%   Weight: 119 kg (262 lb)   Height: 1.753 m (5' 9\")     No LMP for male patient.    Physical Exam  Constitutional: vital signs reviewed. Well developed, well nourished. patient alert and patient without distress.   Psych: Normal mood and affect  Head and Face: Normal and atraumatic.    Cardiovascular: Heart rate normal, normal S1 and S2, no gallops, no murmurs and no pericardial rub. Rhythm: Normal.  Pulmonary: No respiratory distress. Palpation of chest: Normal. Clear bilateral breath sounds.   Skin: Normal skin color and pigmentation, normal skin turgor, and no rash.  MSK: Negative Spurling test bilaterally.  Normal shoulder shrug.  Normal movement of right shoulder.  Right mid belly lateral tricep with spasm, tender.  Normal movement of elbow.  Neurovascular intact.  No skin changes.          Procedures    Point of Care Test & Imaging Results from this visit           Diagnostic study results (if any) were reviewed.  (If applicable) personal preliminary radiology reading: None    Assessment/Plan   Allergies, medications, history, and pertinent labs/EKGs/Imaging reviewed.        Medical Decision Making  See note    Orders and Diagnoses  There are no diagnoses linked to this encounter.    Medical Admin Record      Follow Up Instructions  No follow-ups on file.    At time of discharge patient was clinically well-appearing and HDS for outpatient management. The patient and/or family was educated regarding diagnosis, supportive care, OTC and Rx medications. The patient and/or family was given the opportunity to ask questions prior to discharge and all questions answered. They verbalized understanding of my discussion of the plans for treatment, expected course, indications to return to  or seek further evaluation in ED, and the need for timely follow up as directed.  This medical note was created with the assistance of artificial intelligence (AI) for documentation purposes. The content has been reviewed " and confirmed by the healthcare provider for accuracy and completeness. Patient consented to the use of audio recording and use of AI during their visit.       [facility]           [1]   Current Outpatient Medications   Medication Sig Dispense Refill    diclofenac sodium (Voltaren) 1 % gel APPLY 2 GRAMS TOPICALLY TO THE AFFECTED AREA THREE TIMES DAILY      ondansetron ODT (Zofran-ODT) 4 mg disintegrating tablet Dissolve 1 tablet (4 mg) in the mouth every 8 hours if needed for nausea or vomiting. 10 tablet 0     No current facility-administered medications for this visit.   [2]   Patient Active Problem List  Diagnosis    Tingling in extremities    Tendon calcification    Sciatica of right side    Plantar fasciitis, right    Plantar fasciitis, left    Peroneal tendonitis of right lower extremity    Pain of right heel    Pain in left foot    Osteochondritis dissecans of right foot    Lumbar radiculopathy    Low back pain    Cerumen impaction    Calcaneal spur, right    Degenerative joint disease of both ankles and feet    Trigger ring finger of right hand    Leukopenia    BMI 40.0-44.9, adult (Multi)    Snoring    Hyperlipidemia    Routine health maintenance    Right shoulder pain    Screening for colon cancer   [3]   Past Surgical History:  Procedure Laterality Date    OTHER SURGICAL HISTORY  03/04/2020    Appendectomy

## 2025-08-16 DIAGNOSIS — E78.5 HYPERLIPIDEMIA, UNSPECIFIED HYPERLIPIDEMIA TYPE: ICD-10-CM

## 2025-08-24 LAB
CHOLEST SERPL-MCNC: 165 MG/DL
CHOLEST/HDLC SERPL: 3.4 (CALC)
HDLC SERPL-MCNC: 49 MG/DL
LDLC SERPL CALC-MCNC: 103 MG/DL (CALC)
NONHDLC SERPL-MCNC: 116 MG/DL (CALC)
TRIGL SERPL-MCNC: 51 MG/DL

## 2025-09-02 ENCOUNTER — APPOINTMENT (OUTPATIENT)
Dept: NEUROLOGY | Facility: CLINIC | Age: 60
End: 2025-09-02
Payer: COMMERCIAL

## 2025-09-16 ENCOUNTER — APPOINTMENT (OUTPATIENT)
Dept: PRIMARY CARE | Facility: CLINIC | Age: 60
End: 2025-09-16
Payer: COMMERCIAL

## 2025-10-29 ENCOUNTER — APPOINTMENT (OUTPATIENT)
Dept: NEUROLOGY | Facility: CLINIC | Age: 60
End: 2025-10-29
Payer: COMMERCIAL